# Patient Record
Sex: FEMALE | Race: WHITE | URBAN - METROPOLITAN AREA
[De-identification: names, ages, dates, MRNs, and addresses within clinical notes are randomized per-mention and may not be internally consistent; named-entity substitution may affect disease eponyms.]

---

## 2017-04-03 PROBLEM — I48.91 ATRIAL FIBRILLATION (HCC): Status: ACTIVE | Noted: 2017-04-03

## 2017-04-03 PROBLEM — R07.9 CHEST PAIN: Status: ACTIVE | Noted: 2017-04-03

## 2017-04-05 ENCOUNTER — TELEPHONE (OUTPATIENT)
Dept: CARDIOLOGY CLINIC | Age: 52
End: 2017-04-05

## 2023-05-02 PROBLEM — I10 HTN (HYPERTENSION): Status: ACTIVE | Noted: 2023-05-02

## 2023-05-02 PROBLEM — G45.9 TIA (TRANSIENT ISCHEMIC ATTACK): Status: ACTIVE | Noted: 2023-05-02

## 2023-05-02 PROBLEM — J45.909 ALLERGIC BRONCHITIS WITHOUT COMPLICATION (HHS-HCC): Status: ACTIVE | Noted: 2023-05-02

## 2023-05-02 PROBLEM — F51.04 CHRONIC INSOMNIA: Status: ACTIVE | Noted: 2023-05-02

## 2023-05-02 PROBLEM — E78.5 HYPERLIPIDEMIA: Status: ACTIVE | Noted: 2023-05-02

## 2023-05-02 PROBLEM — D47.2 MONOCLONAL GAMMOPATHY: Status: ACTIVE | Noted: 2023-05-02

## 2023-05-02 PROBLEM — E66.01 MORBID OBESITY (MULTI): Status: ACTIVE | Noted: 2023-05-02

## 2023-05-02 PROBLEM — I48.0 PAROXYSMAL ATRIAL FIBRILLATION (MULTI): Status: ACTIVE | Noted: 2023-05-02

## 2023-05-02 PROBLEM — R22.2 MASS OF SUBCUTANEOUS TISSUE OF BACK: Status: ACTIVE | Noted: 2023-05-02

## 2023-05-02 PROBLEM — K58.2 IRRITABLE BOWEL SYNDROME WITH BOTH CONSTIPATION AND DIARRHEA: Status: ACTIVE | Noted: 2023-05-02

## 2023-05-02 PROBLEM — R07.89 ATYPICAL CHEST PAIN: Status: ACTIVE | Noted: 2023-05-02

## 2023-05-02 PROBLEM — E11.9 DIABETES (MULTI): Status: ACTIVE | Noted: 2023-05-02

## 2023-05-02 PROBLEM — Z86.79 HISTORY OF CEREBROVASCULAR DISEASE: Status: ACTIVE | Noted: 2023-05-02

## 2023-05-02 PROBLEM — S49.91XA INJURY OF RIGHT UPPER EXTREMITY: Status: ACTIVE | Noted: 2023-05-02

## 2023-05-02 PROBLEM — R93.1 ABNORMAL SCREENING CARDIAC CT: Status: ACTIVE | Noted: 2023-05-02

## 2023-05-02 RX ORDER — CYANOCOBALAMIN 1000 UG/ML
1000 INJECTION, SOLUTION INTRAMUSCULAR; SUBCUTANEOUS
COMMUNITY
Start: 2021-01-12

## 2023-05-02 RX ORDER — METOPROLOL SUCCINATE 25 MG/1
1 TABLET, EXTENDED RELEASE ORAL DAILY
COMMUNITY
Start: 2021-04-06 | End: 2023-05-17 | Stop reason: SDUPTHER

## 2023-05-02 RX ORDER — LISINOPRIL 20 MG/1
1 TABLET ORAL DAILY
COMMUNITY
Start: 2020-11-09 | End: 2023-05-17 | Stop reason: SDUPTHER

## 2023-05-02 RX ORDER — FLUTICASONE PROPIONATE 50 MCG
1 SPRAY, SUSPENSION (ML) NASAL DAILY
COMMUNITY
Start: 2021-05-19

## 2023-05-02 RX ORDER — ROSUVASTATIN CALCIUM 5 MG/1
1 TABLET, COATED ORAL DAILY
COMMUNITY
Start: 2021-09-17 | End: 2023-05-17 | Stop reason: SDUPTHER

## 2023-05-02 RX ORDER — CYANOCOBALAMIN (VITAMIN B-12) 500 MCG
1 TABLET ORAL DAILY
COMMUNITY

## 2023-05-02 RX ORDER — AMITRIPTYLINE HYDROCHLORIDE 25 MG/1
25 TABLET, FILM COATED ORAL
COMMUNITY
Start: 2021-05-19 | End: 2023-05-17 | Stop reason: SDUPTHER

## 2023-05-02 RX ORDER — MULTIVITAMIN
1 TABLET ORAL DAILY
COMMUNITY

## 2023-05-02 RX ORDER — FERUMOXYTOL 510 MG/17ML
510 INJECTION INTRAVENOUS
COMMUNITY
Start: 2021-01-12

## 2023-05-02 RX ORDER — TRAZODONE HYDROCHLORIDE 100 MG/1
100 TABLET ORAL
COMMUNITY
Start: 2020-11-09 | End: 2023-05-17 | Stop reason: SDUPTHER

## 2023-05-17 ENCOUNTER — OFFICE VISIT (OUTPATIENT)
Dept: PRIMARY CARE | Facility: CLINIC | Age: 58
End: 2023-05-17
Payer: COMMERCIAL

## 2023-05-17 VITALS
RESPIRATION RATE: 16 BRPM | WEIGHT: 221 LBS | DIASTOLIC BLOOD PRESSURE: 78 MMHG | HEIGHT: 68 IN | SYSTOLIC BLOOD PRESSURE: 144 MMHG | TEMPERATURE: 97.1 F | BODY MASS INDEX: 33.49 KG/M2 | HEART RATE: 78 BPM | OXYGEN SATURATION: 99 %

## 2023-05-17 DIAGNOSIS — G45.9 TIA (TRANSIENT ISCHEMIC ATTACK): ICD-10-CM

## 2023-05-17 DIAGNOSIS — I10 HYPERTENSION, UNSPECIFIED TYPE: Primary | ICD-10-CM

## 2023-05-17 DIAGNOSIS — F51.04 CHRONIC INSOMNIA: ICD-10-CM

## 2023-05-17 DIAGNOSIS — I48.91 ATRIAL FIBRILLATION, UNSPECIFIED TYPE (MULTI): Chronic | ICD-10-CM

## 2023-05-17 DIAGNOSIS — F41.9 ANXIETY: ICD-10-CM

## 2023-05-17 DIAGNOSIS — E11.42 TYPE 2 DIABETES MELLITUS WITH DIABETIC POLYNEUROPATHY, WITHOUT LONG-TERM CURRENT USE OF INSULIN (MULTI): ICD-10-CM

## 2023-05-17 DIAGNOSIS — E78.5 HYPERLIPIDEMIA, UNSPECIFIED HYPERLIPIDEMIA TYPE: ICD-10-CM

## 2023-05-17 PROBLEM — R06.02 SOB (SHORTNESS OF BREATH): Status: ACTIVE | Noted: 2023-05-17

## 2023-05-17 PROBLEM — R07.9 CHEST PAIN: Status: ACTIVE | Noted: 2017-04-03

## 2023-05-17 PROBLEM — E78.00 PURE HYPERCHOLESTEROLEMIA: Status: ACTIVE | Noted: 2023-04-18

## 2023-05-17 PROBLEM — R07.9 CHEST PAIN: Status: RESOLVED | Noted: 2017-04-03 | Resolved: 2023-05-17

## 2023-05-17 PROBLEM — S49.91XA INJURY OF RIGHT UPPER EXTREMITY: Status: RESOLVED | Noted: 2023-05-02 | Resolved: 2023-05-17

## 2023-05-17 PROBLEM — E66.01 MORBID OBESITY (MULTI): Status: RESOLVED | Noted: 2023-05-02 | Resolved: 2023-05-17

## 2023-05-17 PROBLEM — E11.9 DIABETES (MULTI): Status: RESOLVED | Noted: 2023-05-02 | Resolved: 2023-05-17

## 2023-05-17 PROBLEM — E66.9 OBESITY, CLASS II, BMI 35-39.9: Chronic | Status: ACTIVE | Noted: 2019-03-25

## 2023-05-17 PROBLEM — E66.812 OBESITY, CLASS II, BMI 35-39.9: Chronic | Status: ACTIVE | Noted: 2019-03-25

## 2023-05-17 PROBLEM — E78.00 PURE HYPERCHOLESTEROLEMIA: Status: RESOLVED | Noted: 2023-04-18 | Resolved: 2023-05-17

## 2023-05-17 PROBLEM — F17.200 NICOTINE USE DISORDER: Chronic | Status: ACTIVE | Noted: 2019-03-25

## 2023-05-17 PROBLEM — R06.02 SOB (SHORTNESS OF BREATH): Status: RESOLVED | Noted: 2023-05-17 | Resolved: 2023-05-17

## 2023-05-17 PROBLEM — Z86.79 HISTORY OF CEREBROVASCULAR DISEASE: Status: RESOLVED | Noted: 2023-05-02 | Resolved: 2023-05-17

## 2023-05-17 PROBLEM — R07.89 ATYPICAL CHEST PAIN: Status: RESOLVED | Noted: 2023-05-02 | Resolved: 2023-05-17

## 2023-05-17 PROCEDURE — 4004F PT TOBACCO SCREEN RCVD TLK: CPT

## 2023-05-17 PROCEDURE — 99214 OFFICE O/P EST MOD 30 MIN: CPT

## 2023-05-17 PROCEDURE — 3051F HG A1C>EQUAL 7.0%<8.0%: CPT

## 2023-05-17 PROCEDURE — 4010F ACE/ARB THERAPY RXD/TAKEN: CPT

## 2023-05-17 PROCEDURE — 3078F DIAST BP <80 MM HG: CPT

## 2023-05-17 PROCEDURE — 3077F SYST BP >= 140 MM HG: CPT

## 2023-05-17 RX ORDER — AMITRIPTYLINE HYDROCHLORIDE 25 MG/1
25 TABLET, FILM COATED ORAL NIGHTLY
Qty: 90 TABLET | Refills: 3 | Status: SHIPPED | OUTPATIENT
Start: 2023-05-17

## 2023-05-17 RX ORDER — ROSUVASTATIN CALCIUM 5 MG/1
5 TABLET, COATED ORAL DAILY
Qty: 90 TABLET | Refills: 3 | Status: SHIPPED | OUTPATIENT
Start: 2023-05-17

## 2023-05-17 RX ORDER — TRAZODONE HYDROCHLORIDE 100 MG/1
100 TABLET ORAL NIGHTLY
Qty: 30 TABLET | Refills: 1 | Status: SHIPPED | OUTPATIENT
Start: 2023-05-17

## 2023-05-17 RX ORDER — LISINOPRIL 20 MG/1
10 TABLET ORAL DAILY
Qty: 45 TABLET | Refills: 3 | Status: SHIPPED | OUTPATIENT
Start: 2023-05-17

## 2023-05-17 RX ORDER — METOPROLOL SUCCINATE 25 MG/1
12.5 TABLET, EXTENDED RELEASE ORAL DAILY
Qty: 45 TABLET | Refills: 2 | Status: SHIPPED | OUTPATIENT
Start: 2023-05-17

## 2023-05-17 SDOH — ECONOMIC STABILITY: FOOD INSECURITY: WITHIN THE PAST 12 MONTHS, THE FOOD YOU BOUGHT JUST DIDN'T LAST AND YOU DIDN'T HAVE MONEY TO GET MORE.: NEVER TRUE

## 2023-05-17 SDOH — ECONOMIC STABILITY: FOOD INSECURITY: WITHIN THE PAST 12 MONTHS, YOU WORRIED THAT YOUR FOOD WOULD RUN OUT BEFORE YOU GOT MONEY TO BUY MORE.: NEVER TRUE

## 2023-05-17 ASSESSMENT — ENCOUNTER SYMPTOMS
GASTROINTESTINAL NEGATIVE: 1
NEUROLOGICAL NEGATIVE: 1
CARDIOVASCULAR NEGATIVE: 1
ENDOCRINE NEGATIVE: 1
HEMATOLOGIC/LYMPHATIC NEGATIVE: 1
EYES NEGATIVE: 1
MUSCULOSKELETAL NEGATIVE: 1
PSYCHIATRIC NEGATIVE: 1
CONSTITUTIONAL NEGATIVE: 1
RESPIRATORY NEGATIVE: 1

## 2023-05-17 ASSESSMENT — PATIENT HEALTH QUESTIONNAIRE - PHQ9
SUM OF ALL RESPONSES TO PHQ9 QUESTIONS 1 & 2: 0
2. FEELING DOWN, DEPRESSED OR HOPELESS: NOT AT ALL
1. LITTLE INTEREST OR PLEASURE IN DOING THINGS: NOT AT ALL

## 2023-05-17 ASSESSMENT — PAIN SCALES - GENERAL: PAINLEVEL: 4

## 2023-05-17 ASSESSMENT — LIFESTYLE VARIABLES
HOW MANY STANDARD DRINKS CONTAINING ALCOHOL DO YOU HAVE ON A TYPICAL DAY: PATIENT DOES NOT DRINK
SKIP TO QUESTIONS 9-10: 1
HOW OFTEN DO YOU HAVE A DRINK CONTAINING ALCOHOL: NEVER
HOW OFTEN DO YOU HAVE SIX OR MORE DRINKS ON ONE OCCASION: NEVER
AUDIT-C TOTAL SCORE: 0

## 2023-05-17 NOTE — ASSESSMENT & PLAN NOTE
Hypertension ranging from 140-160s, has been out of medications for 3 days. Hypertensive in office today at 144/78.    Having lightheadedness/fatigue and SBP's in the 100s. Has been skipping and halving doses of metoprolol and lisinopril due to symptoms.  Decrease lisinopril to 10mg daily (take 20mg if blood pressure persistently increasing)  Decrease metoprolol XL to 12.5mg daily

## 2023-05-17 NOTE — PROGRESS NOTES
Subjective   Patient ID: Rosalba Carter is a 57 y.o. female who presents for visit to [facility].    Hospitalized at [facility] 4/12-14/2023 for chest pain. C on 4/14/2023 showing moderate non-obstructive CAD (mid LAD 40%, Prox OM1 30%, prox OM2 50%, LVEDP 10mmHg). Discharged home with cardiology follow up.     Checking blood pressures at home and pressures have been running 140-160s before meds; BP drops to 109/65 when taking lisinopril every day. After 3-4 days BP drops too much, gets too low and stays like that and causes fatigue. Stops metoprolol for a few days and then will take half of a lisinopril. Ran out of lisinopril 3 days ago. Reports high stress at work complicated by working a lot of overtime is wearing on her and is likely contributing to uncontrolled hypertension.     Diet: Worsening sweet tooth attributed to high stress. Has tendency to snack at work, hasn't done this since hospitalization in April, tending more toward fruit as a snack.  Trying to increase protein but doesn't eat a lot of meat. Loves veggies. Avoiding junk food, some diet Dr. Pepper (5 oz per day), drinking about 2-3 pots per day  Exercise: No regular exercise outside of work, working a lot of hours and on her feet the whole time  Weight: Generally stable  Water: Doesn't drink much free water  Sleep: OK sleep, getting about 5-6 hours per night due to work hours. Has trouble falling asleep due to having a lot on her mind, has tried melatonin 10mg at bedtime which helped.   Social: -  is disabled and requires a lot of personal care provider by her son who lives with her, lives in a 2 floor home, doesn't use upper level. 2 adult children, no pets  Professional: Works full time as dietary aide at [employer]    Review of Systems   Constitutional: Negative.    HENT: Negative.     Eyes: Negative.    Respiratory: Negative.     Cardiovascular: Negative.    Gastrointestinal: Negative.    Endocrine: Negative.    Genitourinary:  Negative.    Musculoskeletal: Negative.    Skin: Negative.    Neurological: Negative.    Hematological: Negative.    Psychiatric/Behavioral: Negative.          Current Outpatient Medications   Medication Sig Dispense Refill    ascorbic acid (Vitamin C) 100 mg tablet Take 1 tablet (100 mg) by mouth once daily.      cholecalciferol (Vitamin D-3) 10 MCG (400 UNIT) tablet Take 1 tablet (10 mcg) by mouth once daily.      cyanocobalamin (Vitamin B-12) 1,000 mcg/mL injection Inject 1 mL (1,000 mcg) into the shoulder, thigh, or buttocks every 30 (thirty) days.      ferumoxytol (Feraheme) 510 mg/17 mL (30 mg/mL) injection Infuse 17 mL (510 mg) into a venous catheter.      fluticasone (Flonase) 50 mcg/actuation nasal spray Administer 1 spray into affected nostril(s) once daily.      multivitamin tablet Take 1 tablet by mouth once daily.      amitriptyline (Elavil) 25 mg tablet Take 1 tablet (25 mg) by mouth once daily at bedtime. 90 tablet 3    apixaban (Eliquis) 5 mg tablet Take 1 tablet (5 mg) by mouth 2 times a day. 90 tablet 3    lisinopril 20 mg tablet Take 0.5 tablets (10 mg) by mouth once daily. 45 tablet 3    metoprolol succinate XL (Toprol-XL) 25 mg 24 hr tablet Take 0.5 tablets (12.5 mg) by mouth once daily. 45 tablet 2    rosuvastatin (Crestor) 5 mg tablet Take 1 tablet (5 mg) by mouth once daily. 90 tablet 3    traZODone (Desyrel) 100 mg tablet Take 1 tablet (100 mg) by mouth once daily at bedtime. 30 tablet 1     No current facility-administered medications for this visit.     Past Surgical History:   Procedure Laterality Date    OTHER SURGICAL HISTORY  2020     section    OTHER SURGICAL HISTORY  2020    Liz-en-Y gastric bypass    OTHER SURGICAL HISTORY  2020    Hernia repair    OTHER SURGICAL HISTORY  2020    Abdominoplasty    OTHER SURGICAL HISTORY  2020    Hysterectomy    OTHER SURGICAL HISTORY  2021    Lipoma excision     Family History   Problem Relation Name Age  "of Onset    Coronary artery disease Mother      Coronary artery disease Father      Stroke Father      Stroke Brother        Social History     Tobacco Use    Smoking status: Every Day     Packs/day: 1.00     Years: 40.00     Pack years: 40.00     Types: Cigarettes    Smokeless tobacco: Never   Vaping Use    Vaping status: Never Used     Passive vaping exposure: Yes   Substance Use Topics    Alcohol use: Never    Drug use: Never        Objective     Visit Vitals  /78 (BP Location: Right arm, Patient Position: Sitting, BP Cuff Size: Adult) Comment: hasnot taken meds   Pulse 78   Temp 36.2 °C (97.1 °F) (Temporal)   Resp 16   Ht 1.727 m (5' 8\")   Wt 100 kg (221 lb)   SpO2 99%   BMI 33.60 kg/m²   Smoking Status Every Day   BSA 2.19 m²        Physical Exam  Constitutional:       Appearance: Normal appearance.   HENT:      Head: Normocephalic and atraumatic.   Eyes:      Extraocular Movements: Extraocular movements intact.      Pupils: Pupils are equal, round, and reactive to light.   Cardiovascular:      Rate and Rhythm: Regular rhythm. Bradycardia present.   Pulmonary:      Effort: Pulmonary effort is normal.      Breath sounds: Normal breath sounds.   Abdominal:      General: Abdomen is flat. Bowel sounds are normal.      Palpations: Abdomen is soft.   Musculoskeletal:         General: Normal range of motion.   Neurological:      General: No focal deficit present.      Mental Status: She is alert and oriented to person, place, and time.   Psychiatric:         Mood and Affect: Mood normal.         Behavior: Behavior normal.           Assessment/Plan   Problem List Items Addressed This Visit       Chronic insomnia    Relevant Medications    traZODone (Desyrel) 100 mg tablet    HTN (hypertension) - Primary     Hypertension ranging from 140-160s, has been out of medications for 3 days. Hypertensive in office today at 144/78.    Having lightheadedness/fatigue and SBP's in the 100s. Has been skipping and halving doses " of metoprolol and lisinopril due to symptoms.  Decrease lisinopril to 10mg daily (take 20mg if blood pressure persistently increasing)  Decrease metoprolol XL to 12.5mg daily         Relevant Medications    lisinopril 20 mg tablet    metoprolol succinate XL (Toprol-XL) 25 mg 24 hr tablet    Hyperlipidemia    Relevant Medications    rosuvastatin (Crestor) 5 mg tablet    TIA (transient ischemic attack)    Relevant Medications    apixaban (Eliquis) 5 mg tablet    Type 2 diabetes mellitus with diabetic polyneuropathy, without long-term current use of insulin (CMS/HCC)     Would like to try dietary changes, trying to avoid medication  Consider metformin 500 BID at next visit if A1c > 7%         Relevant Orders    CBC    Comprehensive Metabolic Panel    Hemoglobin A1C    Lipid Panel    Atrial fibrillation (CMS/HCC) (Chronic)     NSR today per auscultation    Follow up with cardiology Dr. Khan in June         Relevant Medications    apixaban (Eliquis) 5 mg tablet    metoprolol succinate XL (Toprol-XL) 25 mg 24 hr tablet    Anxiety     Well managed with amitriptyline 25mg at bedtime. Continue.          Relevant Medications    amitriptyline (Elavil) 25 mg tablet       All pertinent lab work and results were reviewed with patient.     Follow up with me in 3 months or sooner as needed for DM2 and hypertension    Temi Yepez, REINA-CNS

## 2023-05-17 NOTE — PATIENT INSTRUCTIONS
Thank you for coming to see me today.  If you have any questions or concerns following our visit, please contact the office.  Phone: [phone]    Follow up with me in 3 months or sooner as needed    1)  DECREASE lisinopril to 10mg daily. Take every day    2) DECREASE metoprolol XL to 12.5mg daily take every day.  Keep checking blood pressures around midday after resting for 5 minutes    3) Work on cutting back sugar and carbs    4) Get fasting labwork before next visit.  The lab is down the vuong from our office.

## 2023-05-17 NOTE — ASSESSMENT & PLAN NOTE
Would like to try dietary changes, trying to avoid medication  Consider metformin 500 BID at next visit if A1c > 7%

## 2023-09-12 PROBLEM — F33.2 SEVERE RECURRENT MAJOR DEPRESSION WITHOUT PSYCHOTIC FEATURES (MULTI): Status: ACTIVE | Noted: 2023-09-12

## 2023-09-12 PROBLEM — R60.0 EDEMA OF BOTH LEGS: Status: ACTIVE | Noted: 2023-09-12

## 2023-09-12 PROBLEM — S29.9XXA RIB INJURY: Status: ACTIVE | Noted: 2023-09-12

## 2023-09-13 VITALS
SYSTOLIC BLOOD PRESSURE: 142 MMHG | HEART RATE: 63 BPM | DIASTOLIC BLOOD PRESSURE: 76 MMHG | WEIGHT: 219 LBS | RESPIRATION RATE: 16 BRPM | BODY MASS INDEX: 34.37 KG/M2 | HEIGHT: 67 IN

## 2023-09-14 RX ORDER — AMLODIPINE BESYLATE 5 MG/1
1 TABLET ORAL DAILY
COMMUNITY

## 2023-09-14 RX ORDER — ALBUTEROL SULFATE 90 UG/1
2 AEROSOL, METERED RESPIRATORY (INHALATION) EVERY 4 HOURS PRN
COMMUNITY
Start: 2020-05-13

## 2023-10-13 DIAGNOSIS — E53.8 VITAMIN B12 DEFICIENCY: ICD-10-CM

## 2023-10-13 DIAGNOSIS — D47.2 MONOCLONAL GAMMOPATHY: ICD-10-CM

## 2023-10-13 DIAGNOSIS — D50.9 IRON DEFICIENCY ANEMIA, UNSPECIFIED IRON DEFICIENCY ANEMIA TYPE: ICD-10-CM

## 2023-10-16 ENCOUNTER — INFUSION (OUTPATIENT)
Dept: HEMATOLOGY/ONCOLOGY | Facility: CLINIC | Age: 58
End: 2023-10-16
Payer: COMMERCIAL

## 2023-10-16 ENCOUNTER — OFFICE VISIT (OUTPATIENT)
Dept: HEMATOLOGY/ONCOLOGY | Facility: CLINIC | Age: 58
End: 2023-10-16
Payer: COMMERCIAL

## 2023-10-16 ENCOUNTER — APPOINTMENT (OUTPATIENT)
Dept: HEMATOLOGY/ONCOLOGY | Facility: CLINIC | Age: 58
End: 2023-10-16
Payer: COMMERCIAL

## 2023-10-16 ENCOUNTER — LAB (OUTPATIENT)
Dept: LAB | Facility: HOSPITAL | Age: 58
End: 2023-10-16
Payer: COMMERCIAL

## 2023-10-16 VITALS
HEART RATE: 78 BPM | WEIGHT: 225.31 LBS | SYSTOLIC BLOOD PRESSURE: 145 MMHG | TEMPERATURE: 97.7 F | BODY MASS INDEX: 35.36 KG/M2 | HEIGHT: 67 IN | RESPIRATION RATE: 16 BRPM | DIASTOLIC BLOOD PRESSURE: 78 MMHG | OXYGEN SATURATION: 96 %

## 2023-10-16 DIAGNOSIS — E53.8 VITAMIN B12 DEFICIENCY: ICD-10-CM

## 2023-10-16 DIAGNOSIS — E53.8 B12 DEFICIENCY: ICD-10-CM

## 2023-10-16 DIAGNOSIS — D50.9 IRON DEFICIENCY ANEMIA, UNSPECIFIED IRON DEFICIENCY ANEMIA TYPE: ICD-10-CM

## 2023-10-16 DIAGNOSIS — D47.2 MONOCLONAL GAMMOPATHY: ICD-10-CM

## 2023-10-16 DIAGNOSIS — E53.8 B12 DEFICIENCY: Primary | ICD-10-CM

## 2023-10-16 LAB
BASOPHILS # BLD AUTO: 0.05 X10*3/UL (ref 0–0.1)
BASOPHILS NFR BLD AUTO: 0.5 %
EOSINOPHIL # BLD AUTO: 0.34 X10*3/UL (ref 0–0.7)
EOSINOPHIL NFR BLD AUTO: 3.7 %
ERYTHROCYTE [DISTWIDTH] IN BLOOD BY AUTOMATED COUNT: 13.1 % (ref 11.5–14.5)
FERRITIN SERPL-MCNC: 21 NG/ML (ref 8–150)
HCT VFR BLD AUTO: 43.8 % (ref 36–46)
HGB BLD-MCNC: 13.9 G/DL (ref 12–16)
IGA SERPL-MCNC: 249 MG/DL (ref 70–400)
IGG SERPL-MCNC: 911 MG/DL (ref 700–1600)
IGM SERPL-MCNC: 70 MG/DL (ref 40–230)
IMM GRANULOCYTES # BLD AUTO: 0.02 X10*3/UL (ref 0–0.7)
IMM GRANULOCYTES NFR BLD AUTO: 0.2 % (ref 0–0.9)
IRON SATN MFR SERPL: 13 % (ref 25–45)
IRON SERPL-MCNC: 49 UG/DL (ref 35–150)
LYMPHOCYTES # BLD AUTO: 2.05 X10*3/UL (ref 1.2–4.8)
LYMPHOCYTES NFR BLD AUTO: 22.2 %
MCH RBC QN AUTO: 26.7 PG (ref 26–34)
MCHC RBC AUTO-ENTMCNC: 31.7 G/DL (ref 32–36)
MCV RBC AUTO: 84 FL (ref 80–100)
MONOCYTES # BLD AUTO: 0.63 X10*3/UL (ref 0.1–1)
MONOCYTES NFR BLD AUTO: 6.8 %
NEUTROPHILS # BLD AUTO: 6.14 X10*3/UL (ref 1.2–7.7)
NEUTROPHILS NFR BLD AUTO: 66.6 %
PLATELET # BLD AUTO: 286 X10*3/UL (ref 150–450)
PMV BLD AUTO: 9.3 FL (ref 7.5–11.5)
RBC # BLD AUTO: 5.2 X10*6/UL (ref 4–5.2)
TIBC SERPL-MCNC: 378 UG/DL (ref 240–445)
UIBC SERPL-MCNC: 329 UG/DL (ref 110–370)
VIT B12 SERPL-MCNC: 315 PG/ML (ref 211–911)
WBC # BLD AUTO: 9.2 X10*3/UL (ref 4.4–11.3)

## 2023-10-16 PROCEDURE — 99213 OFFICE O/P EST LOW 20 MIN: CPT | Performed by: INTERNAL MEDICINE

## 2023-10-16 PROCEDURE — 82728 ASSAY OF FERRITIN: CPT

## 2023-10-16 PROCEDURE — 83521 IG LIGHT CHAINS FREE EACH: CPT | Mod: CMCLAB

## 2023-10-16 PROCEDURE — 83550 IRON BINDING TEST: CPT

## 2023-10-16 PROCEDURE — 3078F DIAST BP <80 MM HG: CPT | Performed by: INTERNAL MEDICINE

## 2023-10-16 PROCEDURE — 85025 COMPLETE CBC W/AUTO DIFF WBC: CPT

## 2023-10-16 PROCEDURE — 4004F PT TOBACCO SCREEN RCVD TLK: CPT | Performed by: INTERNAL MEDICINE

## 2023-10-16 PROCEDURE — 2500000004 HC RX 250 GENERAL PHARMACY W/ HCPCS (ALT 636 FOR OP/ED): Mod: SE | Performed by: INTERNAL MEDICINE

## 2023-10-16 PROCEDURE — 3077F SYST BP >= 140 MM HG: CPT | Performed by: INTERNAL MEDICINE

## 2023-10-16 PROCEDURE — 82607 VITAMIN B-12: CPT

## 2023-10-16 PROCEDURE — 82784 ASSAY IGA/IGD/IGG/IGM EACH: CPT | Mod: CMCLAB

## 2023-10-16 PROCEDURE — 4010F ACE/ARB THERAPY RXD/TAKEN: CPT | Performed by: INTERNAL MEDICINE

## 2023-10-16 PROCEDURE — 3051F HG A1C>EQUAL 7.0%<8.0%: CPT | Performed by: INTERNAL MEDICINE

## 2023-10-16 PROCEDURE — 96372 THER/PROPH/DIAG INJ SC/IM: CPT

## 2023-10-16 PROCEDURE — 83521 IG LIGHT CHAINS FREE EACH: CPT

## 2023-10-16 PROCEDURE — 36415 COLL VENOUS BLD VENIPUNCTURE: CPT

## 2023-10-16 RX ORDER — CYANOCOBALAMIN 1000 UG/ML
1000 INJECTION, SOLUTION INTRAMUSCULAR; SUBCUTANEOUS ONCE
Status: COMPLETED | OUTPATIENT
Start: 2023-10-16 | End: 2023-10-16

## 2023-10-16 RX ORDER — ALBUTEROL SULFATE 0.83 MG/ML
3 SOLUTION RESPIRATORY (INHALATION) AS NEEDED
Status: DISCONTINUED | OUTPATIENT
Start: 2023-10-16 | End: 2023-10-16 | Stop reason: HOSPADM

## 2023-10-16 RX ORDER — FAMOTIDINE 10 MG/ML
20 INJECTION INTRAVENOUS ONCE AS NEEDED
Status: DISCONTINUED | OUTPATIENT
Start: 2023-10-16 | End: 2023-10-16 | Stop reason: HOSPADM

## 2023-10-16 RX ORDER — DIPHENHYDRAMINE HYDROCHLORIDE 50 MG/ML
50 INJECTION INTRAMUSCULAR; INTRAVENOUS AS NEEDED
Status: DISCONTINUED | OUTPATIENT
Start: 2023-10-16 | End: 2023-10-16 | Stop reason: HOSPADM

## 2023-10-16 RX ORDER — DIPHENHYDRAMINE HYDROCHLORIDE 50 MG/ML
50 INJECTION INTRAMUSCULAR; INTRAVENOUS AS NEEDED
Status: CANCELLED | OUTPATIENT
Start: 2024-01-01

## 2023-10-16 RX ORDER — DIPHENHYDRAMINE HYDROCHLORIDE 50 MG/ML
50 INJECTION INTRAMUSCULAR; INTRAVENOUS AS NEEDED
Status: CANCELLED | OUTPATIENT
Start: 2023-10-16

## 2023-10-16 RX ORDER — FAMOTIDINE 10 MG/ML
20 INJECTION INTRAVENOUS ONCE AS NEEDED
Status: CANCELLED | OUTPATIENT
Start: 2024-01-01

## 2023-10-16 RX ORDER — CYANOCOBALAMIN 1000 UG/ML
1000 INJECTION, SOLUTION INTRAMUSCULAR; SUBCUTANEOUS ONCE
Status: CANCELLED | OUTPATIENT
Start: 2024-01-01

## 2023-10-16 RX ORDER — ALBUTEROL SULFATE 0.83 MG/ML
3 SOLUTION RESPIRATORY (INHALATION) AS NEEDED
Status: CANCELLED | OUTPATIENT
Start: 2023-10-16

## 2023-10-16 RX ORDER — FAMOTIDINE 10 MG/ML
20 INJECTION INTRAVENOUS ONCE AS NEEDED
Status: CANCELLED | OUTPATIENT
Start: 2023-10-16

## 2023-10-16 RX ORDER — CYANOCOBALAMIN 1000 UG/ML
1000 INJECTION, SOLUTION INTRAMUSCULAR; SUBCUTANEOUS ONCE
Status: CANCELLED | OUTPATIENT
Start: 2023-10-16

## 2023-10-16 RX ORDER — EPINEPHRINE 0.3 MG/.3ML
0.3 INJECTION SUBCUTANEOUS EVERY 5 MIN PRN
Status: CANCELLED | OUTPATIENT
Start: 2024-01-01

## 2023-10-16 RX ORDER — EPINEPHRINE 0.3 MG/.3ML
0.3 INJECTION SUBCUTANEOUS EVERY 5 MIN PRN
Status: DISCONTINUED | OUTPATIENT
Start: 2023-10-16 | End: 2023-10-16 | Stop reason: HOSPADM

## 2023-10-16 RX ORDER — ALBUTEROL SULFATE 0.83 MG/ML
3 SOLUTION RESPIRATORY (INHALATION) AS NEEDED
Status: CANCELLED | OUTPATIENT
Start: 2024-01-01

## 2023-10-16 RX ORDER — EPINEPHRINE 0.3 MG/.3ML
0.3 INJECTION SUBCUTANEOUS EVERY 5 MIN PRN
Status: CANCELLED | OUTPATIENT
Start: 2023-10-16

## 2023-10-16 RX ADMIN — CYANOCOBALAMIN 1000 MCG: 1000 INJECTION, SOLUTION INTRAMUSCULAR at 14:22

## 2023-10-16 ASSESSMENT — PATIENT HEALTH QUESTIONNAIRE - PHQ9
SUM OF ALL RESPONSES TO PHQ9 QUESTIONS 1 AND 2: 2
2. FEELING DOWN, DEPRESSED OR HOPELESS: MORE THAN HALF THE DAYS
10. IF YOU CHECKED OFF ANY PROBLEMS, HOW DIFFICULT HAVE THESE PROBLEMS MADE IT FOR YOU TO DO YOUR WORK, TAKE CARE OF THINGS AT HOME, OR GET ALONG WITH OTHER PEOPLE: SOMEWHAT DIFFICULT
1. LITTLE INTEREST OR PLEASURE IN DOING THINGS: NOT AT ALL

## 2023-10-16 ASSESSMENT — COLUMBIA-SUICIDE SEVERITY RATING SCALE - C-SSRS
2. HAVE YOU ACTUALLY HAD ANY THOUGHTS OF KILLING YOURSELF?: NO
6. HAVE YOU EVER DONE ANYTHING, STARTED TO DO ANYTHING, OR PREPARED TO DO ANYTHING TO END YOUR LIFE?: NO
1. IN THE PAST MONTH, HAVE YOU WISHED YOU WERE DEAD OR WISHED YOU COULD GO TO SLEEP AND NOT WAKE UP?: NO

## 2023-10-16 ASSESSMENT — PAIN SCALES - GENERAL: PAINLEVEL: 0-NO PAIN

## 2023-10-16 NOTE — PROGRESS NOTES
Rosalba is here for a follow up visit with Dr. Villalba and every 12 weeks b12 injection. She has no complaints or concerns other than fatigue. Patient states she has her schedule. Tolerated her injection.

## 2023-10-16 NOTE — PROGRESS NOTES
Patient Visit Information:   Visit Type: Follow Up Visit      History of Present Illness:      ID Statement:    REGGIE BARRIGA is a 57 year old Female        Chief Complaint: IgM lambda monoclonal gammopathy,  B12 deficiency   Interval History:    1.  B12 deficiency #2 iron deficiency #3 gastric bypass #4 IgM monoclonal gammopathy              Patient is a 57-year-old female with history of  diabetes mellitus, hypertension, iron deficiency anemia, B12 deficiency anemia, s/p  gastric   bypass, she was recently evaluated because of sore throat, left ear pain and fever and diagnoses bronchitis.     Patient has long history of weakness, fatigue, muscular pain and has history of chronic anemia.  Patient is taking ferrous sulfate and B12 by mouth.  History of  weight gain approximately 25 pounds during past 6 months.  She is not very active, she still  smokes.  She has generalized weakness and fatigue.  She has history of anxiety and depression.     In June 2020 CBC dated showed WBC count 11.8, hemoglobin 11.8, hematocrit 40, MCV 79 and platelets 329.     CMP and LFT within normal limits, GUIDO negative, CRP 0.81 and sedimentation rate 47.     Serum protein electrophoresis and immunofixation did show IgM lambda monoclonal gammopathy, very small quantity of M spike, difficult to quantitate.     Patient is here for further evaluation.     Lab results discussed with the patient, medical records reviewed with patient.     Patient not compliant to medical care, no screening colonoscopy is done.   September 21, 2020     Patient has a history of iron deficiency anemia and B12 deficiency anemia and feeling better after IV Feraheme and B12 supplement.  Hemoglobin is stable,    Patient has IgM lambda monoclonal gammopathy with a due to MGUS which is stable      December 7, 2020     Patient is still complaining of weakness and fatigue, good appetite not doing regular exercise, he not working at this time.  Hemoglobin is  14.9.     March 1, 2021      Patient is feeling better today hemoglobin is 15.1, patient is receiving B12 injection every 4 weeks.  Good appetite, no nausea vomiting, no abdominal pain, still complaining of weakness and fatigue which is improving  June 21, 2021      Patient has a history of iron deficiency anemia and B12 deficiency anemia, patient receiving B12 injection every 4 weekly hemoglobin have been stable.  Patient is feeling better, something patient denied fatigue, good appetite, no nausea vomiting, no  abdominal pain, no diarrhea, no history of infection or bony pain.    10/16/3  Patient history of B12 deficiency anemia which have been resolved patient is receiving B12 injection.   Patient still has some weakness and fatigue.  Hemoglobin have been stable     No fever, good appetite no nausea vomiting, no abdominal pain, patient was complaining of weakness and fatigue still working full-time     History of IgM monoclonal gammopathy which also have been stable     Review of Systems:   Review of Systems:    Patient is very anxious,     Complaining of generalized weakness, fatigue, muscular pain     No headache and dizziness     No sore throat today, no fever     No chest pain, no shortness of breath, no cough     No nausea vomiting     No dyspepsia     Nonspecific abdominal pain     No diarrhea and constipation, no reactive bleed     No dysuria and hematuria     No vaginal discharge, status post hysterectomy     Patient not very active most times to home     She not doing regular exercise     She is gaining weight and she still smoke     No history of fall     Generalized weakness        Allergies and Intolerances:       Allergies:         ibuprofen: Drug, Unknown, Active         Latex: Latex, Hives/Urticaria, Active     Outpatient Medication Profile:  * Patient Currently Takes Medications as of 07-Dec-2022 12:58 documented in Structured Notes         Vitamin D3 : Last Dose Taken:  , orally once a day          Vitamin C: Last Dose Taken:  , orally once a day         multivitamin Multiple Vitamins oral capsule: Last Dose Taken:  , 1 cap(s)  oral once a day         Crestor 5 mg oral tablet: Last Dose Taken:  , 1 tab(s) orally once a  day         lisinopril 20 mg oral tablet: Last Dose Taken:  , 1 tab(s) orally once  a day         metoprolol: Last Dose Taken:               Medical History:         Smoking: ICD-10: F17.200, Status: Active         B12 deficiency anemia: ICD-10: D51.9, Status: Active         Iron deficiency anemia: ICD-10: D50.9, Status: Active         IgM monoclonal gammopathy of uncertain significance: ICD-10:  D47.2, Status: Active         Hypertension: ICD-10: I10, Status: Active         Diabetes mellitus: ICD-10: E11.9, Status: Active       Surg History:         Status post hysterectomy: ICD-10: Z90.710, Status: Active         Status post gastric bypass for obesity: ICD-10: Z98.84, Status:  Active     Family History: No Family History items are recorded  in the problem list.      Social History:   Social Substance History:  ·  Smoking Status current every day smoker            Vitals and Measurements:   Vitals: Temp: 36.7  HR: 77  RR: 16  BP: 140/84  SPO2%:   95   Measurements: HT(cm): 170.5  WT(kg): 98.9  BSA: 2.16   BMI:  34   Last 3 Weights & Heights: Date:                           Weight/Scale Type:                    Height:   07-Dec-2022 12:49                98.9  kg                     170.5  cm  16-Nov-2022 12:49                99.1  kg                     170.5  cm  21-Sep-2022 13:20                97.5  kg                     170.5  cm      Physical Exam:      Constitutional: awake/alert/oriented x3, no distress,   Eyes: PERRL, EOMI, clear sclera   ENMT: mucous membranes moist,   Head/Neck: Neck supple,   Respiratory/Thorax: Patent airways, CTAB, normal  breath sounds   Cardiovascular: Regular, rate and rhythm,  , normal  S 1and S 2   Gastrointestinal: Nondistended, soft,   nontender,      no  organomegaly, +BS,   Extremities: normal extremities, no cyanosis edema,   Neurological: alert and oriented x3,   Lymphatic: No significant lymphadenopathy   Psychological: Appropriate mood and behavior, very  anxious   Skin: Warm and dry, no lesions, no rashes         Lab Results:     ·  Results             Lab Results   Component Value Date    WBC 9.2 10/16/2023    HGB 13.9 10/16/2023    HCT 43.8 10/16/2023     10/16/2023    CHOL 177 04/13/2023    TRIG 86 04/13/2023    HDL 41.3 04/13/2023    ALT 12 04/13/2023    AST 12 04/13/2023    NA CANCELED 04/15/2023    K CANCELED 04/15/2023    CL CANCELED 04/15/2023    CREATININE CANCELED 04/15/2023    BUN CANCELED 04/15/2023    CO2 CANCELED 04/15/2023    TSH 1.55 07/29/2020    HGBA1C 7.5 (A) 04/13/2023     par         Assessment and Plan:      Assessment and Plan:   Assessment:    #1  MGUS, IgM lambda monoclonal gammopathy     #2.  S/p gastric bypass , obesity  , BMI of 38.2,     #3 iron deficiency anemia     #4 B12 deficiency anemia     #5 diabetes mellitus     #6 hypertension     #7 anxiety        Patient is a 57-year-old female with a history of hypertension, diabetes mellitus, status post gastric bypass, history of iron deficiency anemia and B12 deficiency anemia was evaluated for possible bronchitis.     Serum protein electrophoresis and immunofixation did show IgM lambda monoclonal gammopathy.  Most probably we are dealing with the MGUS.  Renal function within normal limits, calcium level within normal limits, patient with chronic anemia due to iron  deficiency anemia, clinically I am not suspecting multiple myeloma.     Pathophysiology paraproteinemia discussed with the patient and     #2 iron deficiency anemia, it is due to malabsorption, I will make a arrangement for IV Feraheme and check iron studies today     #3 B12 deficiency anemia, will make arrangements for B12 injections     #4 BMI 38.2, patient advised to have regular exercise, Klonopin intake none  more than 2000, try to lose body weight at least by 50 pounds.     #4 smoking,   long discussion with the patient regarding possible complications of long-term smoking and suggested to stop smoking and   patient promised to do so.     Plan I will check IgG M level today and also check beta-2 microglobulin and serum light chain.  I will check iron studies and B12 level and make arrangement for IV Feraheme and B12 supplement     March 1, 2021     #1 iron deficiency anemia, hemoglobin is stable     #2 B12 deficiency anemia, continue B12 supplements every 4 weeks     #3 MGUS, IgM lambda monoclonal gammopathy, stable continue to monitor     #4 BMI 38, continued regular exercise, Attempted Now More Than 2000-calorie per Day.       Lab Discussed with the Patient, Continue B12 Supplement, repeat  CBC, Iron Studies, B12 after 3 Months     June 21, 2021        #1 iron deficiency anemia, hemoglobin is stable     #2 B12 deficiency anemia, continue B12 supplements every 4 weeks     #3 MGUS, IgM lambda monoclonal gammopathy, stable continue to monitor     #4 BMI 37, continued regular exercise, Attempted Now More Than 2000-calorie per Day.     Clinically patient is doing very well, hemoglobin is stable, patient will receive B12 injection today then changed B12 injection every 8 weeks.  I will check iron studies and B12 level today.         Reevaluation after 4 months.      Monitor CBC, iron studies and B12 level.      Again suggested to have regular exercise try to lose some body weight.     10/16/23        #1 iron deficiency anemia, hemoglobin is stable     #2 B12 deficiency anemia, continue B12 supplements every 4 weeks     #3 MGUS, IgM lambda monoclonal gammopathy, stable continue to monitor     #4 BMI 34, continued regular exercise, Attempted Now More Than 2000-calorie per Day.     Clinically stable, hemoglobin is stable, B12 injection every 12 weeks.      Repeat CBC after 3 months  ,check iron study and B12 level.  Follow-up  "6 months  Plan:    Plan as above.      Follow-up after 6 months     Time spent 20 minutes        Patient Instructions:      Instructions Type: nutrition            Note Recipients: Jonna Campoverde MD -  [phone]   Select \"Yes\" when ready to send to Provider(s) Listed Above:  Note sent to providers named above         "

## 2023-10-16 NOTE — PATIENT INSTRUCTIONS
Continue Vitamin b12 injections every 12 weeks.     Labs to be done every 12 weeks have been ordered. Lab appointments are no longer scheduled.   You can arrive at least 15 minutes before appointment for injection to have blood work completed.     Follow up with Dr. Villalba in 6 months.

## 2023-10-17 LAB
KAPPA LC SERPL-MCNC: 3.4 MG/DL (ref 0.33–1.94)
KAPPA LC/LAMBDA SER: 1.98 {RATIO} (ref 0.26–1.65)
LAMBDA LC SERPL-MCNC: 1.72 MG/DL (ref 0.57–2.63)

## 2024-04-02 ENCOUNTER — INFUSION (OUTPATIENT)
Dept: HEMATOLOGY/ONCOLOGY | Facility: CLINIC | Age: 59
End: 2024-04-02
Payer: COMMERCIAL

## 2024-04-02 VITALS
HEART RATE: 87 BPM | WEIGHT: 223 LBS | RESPIRATION RATE: 16 BRPM | HEIGHT: 67 IN | SYSTOLIC BLOOD PRESSURE: 148 MMHG | BODY MASS INDEX: 35 KG/M2 | OXYGEN SATURATION: 96 % | DIASTOLIC BLOOD PRESSURE: 70 MMHG | TEMPERATURE: 97.7 F

## 2024-04-02 DIAGNOSIS — E53.8 B12 DEFICIENCY: ICD-10-CM

## 2024-04-02 PROCEDURE — 96372 THER/PROPH/DIAG INJ SC/IM: CPT | Performed by: INTERNAL MEDICINE

## 2024-04-02 PROCEDURE — 96372 THER/PROPH/DIAG INJ SC/IM: CPT

## 2024-04-02 PROCEDURE — 2500000004 HC RX 250 GENERAL PHARMACY W/ HCPCS (ALT 636 FOR OP/ED): Mod: SE | Performed by: INTERNAL MEDICINE

## 2024-04-02 RX ORDER — FAMOTIDINE 10 MG/ML
20 INJECTION INTRAVENOUS ONCE AS NEEDED
Status: DISCONTINUED | OUTPATIENT
Start: 2024-04-02 | End: 2024-04-02 | Stop reason: HOSPADM

## 2024-04-02 RX ORDER — ALBUTEROL SULFATE 0.83 MG/ML
3 SOLUTION RESPIRATORY (INHALATION) AS NEEDED
Status: DISCONTINUED | OUTPATIENT
Start: 2024-04-02 | End: 2024-04-02 | Stop reason: HOSPADM

## 2024-04-02 RX ORDER — EPINEPHRINE 0.3 MG/.3ML
0.3 INJECTION SUBCUTANEOUS EVERY 5 MIN PRN
OUTPATIENT
Start: 2024-07-01

## 2024-04-02 RX ORDER — DIPHENHYDRAMINE HYDROCHLORIDE 50 MG/ML
50 INJECTION INTRAMUSCULAR; INTRAVENOUS AS NEEDED
OUTPATIENT
Start: 2024-07-01

## 2024-04-02 RX ORDER — FAMOTIDINE 10 MG/ML
20 INJECTION INTRAVENOUS ONCE AS NEEDED
OUTPATIENT
Start: 2024-07-01

## 2024-04-02 RX ORDER — ALBUTEROL SULFATE 0.83 MG/ML
3 SOLUTION RESPIRATORY (INHALATION) AS NEEDED
OUTPATIENT
Start: 2024-07-01

## 2024-04-02 RX ORDER — DIPHENHYDRAMINE HYDROCHLORIDE 50 MG/ML
50 INJECTION INTRAMUSCULAR; INTRAVENOUS AS NEEDED
Status: DISCONTINUED | OUTPATIENT
Start: 2024-04-02 | End: 2024-04-02 | Stop reason: HOSPADM

## 2024-04-02 RX ORDER — CYANOCOBALAMIN 1000 UG/ML
1000 INJECTION, SOLUTION INTRAMUSCULAR; SUBCUTANEOUS ONCE
Status: COMPLETED | OUTPATIENT
Start: 2024-04-02 | End: 2024-04-02

## 2024-04-02 RX ORDER — EPINEPHRINE 0.3 MG/.3ML
0.3 INJECTION SUBCUTANEOUS EVERY 5 MIN PRN
Status: DISCONTINUED | OUTPATIENT
Start: 2024-04-02 | End: 2024-04-02 | Stop reason: HOSPADM

## 2024-04-02 RX ORDER — CYANOCOBALAMIN 1000 UG/ML
1000 INJECTION, SOLUTION INTRAMUSCULAR; SUBCUTANEOUS ONCE
OUTPATIENT
Start: 2024-07-01

## 2024-04-02 RX ADMIN — CYANOCOBALAMIN 1000 MCG: 1000 INJECTION INTRAMUSCULAR; SUBCUTANEOUS at 14:00

## 2024-04-02 ASSESSMENT — PAIN SCALES - GENERAL: PAINLEVEL: 0-NO PAIN

## 2024-04-02 NOTE — PROGRESS NOTES
Pt arrived awake, alert, oriented, no apparent distress with unlabored breaths. Pt reports feeling well toay without s/sx of illness. Pt here for her ordered quarterly B12 injection, in which she received and tolerated well. Pt given AVS with verbalized understanding of future appointments, no further questions or concerns, observed ambulating freely to exit area for discharge home.

## 2024-04-16 ENCOUNTER — OFFICE VISIT (OUTPATIENT)
Dept: HEMATOLOGY/ONCOLOGY | Facility: CLINIC | Age: 59
End: 2024-04-16
Payer: COMMERCIAL

## 2024-04-16 VITALS
OXYGEN SATURATION: 94 % | HEIGHT: 67 IN | WEIGHT: 224.43 LBS | DIASTOLIC BLOOD PRESSURE: 77 MMHG | SYSTOLIC BLOOD PRESSURE: 160 MMHG | TEMPERATURE: 97.9 F | RESPIRATION RATE: 16 BRPM | BODY MASS INDEX: 35.22 KG/M2 | HEART RATE: 75 BPM

## 2024-04-16 DIAGNOSIS — E53.8 B12 DEFICIENCY: ICD-10-CM

## 2024-04-16 DIAGNOSIS — D47.2 MONOCLONAL GAMMOPATHY: Primary | ICD-10-CM

## 2024-04-16 LAB
ALBUMIN SERPL BCP-MCNC: 4 G/DL (ref 3.4–5)
ALP SERPL-CCNC: 93 U/L (ref 33–110)
ALT SERPL W P-5'-P-CCNC: 10 U/L (ref 7–45)
ANION GAP SERPL CALC-SCNC: 9 MMOL/L (ref 10–20)
AST SERPL W P-5'-P-CCNC: 12 U/L (ref 9–39)
BASOPHILS # BLD AUTO: 0.03 X10*3/UL (ref 0–0.1)
BASOPHILS NFR BLD AUTO: 0.3 %
BILIRUB SERPL-MCNC: 0.4 MG/DL (ref 0–1.2)
BUN SERPL-MCNC: 12 MG/DL (ref 6–23)
CALCIUM SERPL-MCNC: 8.9 MG/DL (ref 8.6–10.3)
CHLORIDE SERPL-SCNC: 106 MMOL/L (ref 98–107)
CO2 SERPL-SCNC: 26 MMOL/L (ref 21–32)
CREAT SERPL-MCNC: 0.7 MG/DL (ref 0.5–1.05)
EGFRCR SERPLBLD CKD-EPI 2021: >90 ML/MIN/1.73M*2
EOSINOPHIL # BLD AUTO: 0.27 X10*3/UL (ref 0–0.7)
EOSINOPHIL NFR BLD AUTO: 2.9 %
ERYTHROCYTE [DISTWIDTH] IN BLOOD BY AUTOMATED COUNT: 13 % (ref 11.5–14.5)
GLUCOSE SERPL-MCNC: 157 MG/DL (ref 74–99)
HCT VFR BLD AUTO: 41.2 % (ref 36–46)
HGB BLD-MCNC: 13.2 G/DL (ref 12–16)
IGM SERPL-MCNC: 60 MG/DL (ref 40–230)
IMM GRANULOCYTES # BLD AUTO: 0.02 X10*3/UL (ref 0–0.7)
IMM GRANULOCYTES NFR BLD AUTO: 0.2 % (ref 0–0.9)
LYMPHOCYTES # BLD AUTO: 2.54 X10*3/UL (ref 1.2–4.8)
LYMPHOCYTES NFR BLD AUTO: 27.4 %
MCH RBC QN AUTO: 26.8 PG (ref 26–34)
MCHC RBC AUTO-ENTMCNC: 32 G/DL (ref 32–36)
MCV RBC AUTO: 84 FL (ref 80–100)
MONOCYTES # BLD AUTO: 0.52 X10*3/UL (ref 0.1–1)
MONOCYTES NFR BLD AUTO: 5.6 %
NEUTROPHILS # BLD AUTO: 5.88 X10*3/UL (ref 1.2–7.7)
NEUTROPHILS NFR BLD AUTO: 63.6 %
PLATELET # BLD AUTO: 302 X10*3/UL (ref 150–450)
POTASSIUM SERPL-SCNC: 4 MMOL/L (ref 3.5–5.3)
PROT SERPL-MCNC: 6.8 G/DL (ref 6.4–8.2)
RBC # BLD AUTO: 4.93 X10*6/UL (ref 4–5.2)
SODIUM SERPL-SCNC: 137 MMOL/L (ref 136–145)
VIT B12 SERPL-MCNC: 318 PG/ML (ref 211–911)
WBC # BLD AUTO: 9.3 X10*3/UL (ref 4.4–11.3)

## 2024-04-16 PROCEDURE — 99213 OFFICE O/P EST LOW 20 MIN: CPT | Performed by: INTERNAL MEDICINE

## 2024-04-16 PROCEDURE — 82784 ASSAY IGA/IGD/IGG/IGM EACH: CPT | Mod: PORLAB | Performed by: INTERNAL MEDICINE

## 2024-04-16 PROCEDURE — 3077F SYST BP >= 140 MM HG: CPT | Performed by: INTERNAL MEDICINE

## 2024-04-16 PROCEDURE — 85025 COMPLETE CBC W/AUTO DIFF WBC: CPT | Performed by: INTERNAL MEDICINE

## 2024-04-16 PROCEDURE — 36415 COLL VENOUS BLD VENIPUNCTURE: CPT | Performed by: INTERNAL MEDICINE

## 2024-04-16 PROCEDURE — 80053 COMPREHEN METABOLIC PANEL: CPT | Performed by: INTERNAL MEDICINE

## 2024-04-16 PROCEDURE — 4010F ACE/ARB THERAPY RXD/TAKEN: CPT | Performed by: INTERNAL MEDICINE

## 2024-04-16 PROCEDURE — 3078F DIAST BP <80 MM HG: CPT | Performed by: INTERNAL MEDICINE

## 2024-04-16 PROCEDURE — 82607 VITAMIN B-12: CPT | Performed by: INTERNAL MEDICINE

## 2024-04-16 ASSESSMENT — PAIN SCALES - GENERAL: PAINLEVEL: 0-NO PAIN

## 2024-04-16 NOTE — PROGRESS NOTES
Patient Visit Information:   Visit Type: Follow Up Visit      History of Present Illness:      ID Statement:    REGGIE BARRIGA is a 57 year old Female        Chief Complaint: IgM lambda monoclonal gammopathy,  B12 deficiency   Interval History:    1.  B12 deficiency #2 iron deficiency #3 gastric bypass #4 IgM monoclonal gammopathy              Patient is a 57-year-old female with history of  diabetes mellitus, hypertension, iron deficiency anemia, B12 deficiency anemia, s/p  gastric   bypass, she was recently evaluated because of sore throat, left ear pain and fever and diagnoses bronchitis.     Patient has long history of weakness, fatigue, muscular pain and has history of chronic anemia.  Patient is taking ferrous sulfate and B12 by mouth.  History of  weight gain approximately 25 pounds during past 6 months.  She is not very active, she still  smokes.  She has generalized weakness and fatigue.  She has history of anxiety and depression.     In June 2020 CBC dated showed WBC count 11.8, hemoglobin 11.8, hematocrit 40, MCV 79 and platelets 329.     CMP and LFT within normal limits, GUIDO negative, CRP 0.81 and sedimentation rate 47.     Serum protein electrophoresis and immunofixation did show IgM lambda monoclonal gammopathy, very small quantity of M spike, difficult to quantitate.     Patient is here for further evaluation.     Lab results discussed with the patient, medical records reviewed with patient.     Patient not compliant to medical care, no screening colonoscopy is done.   September 21, 2020     Patient has a history of iron deficiency anemia and B12 deficiency anemia and feeling better after IV Feraheme and B12 supplement.  Hemoglobin is stable,    Patient has IgM lambda monoclonal gammopathy with a due to MGUS which is stable      December 7, 2020     Patient is still complaining of weakness and fatigue, good appetite not doing regular exercise, he not working at this time.  Hemoglobin is  14.9.     March 1, 2021      Patient is feeling better today hemoglobin is 15.1, patient is receiving B12 injection every 4 weeks.  Good appetite, no nausea vomiting, no abdominal pain, still complaining of weakness and fatigue which is improving  June 21, 2021      Patient has a history of iron deficiency anemia and B12 deficiency anemia, patient receiving B12 injection every 4 weekly hemoglobin have been stable.  Patient is feeling better, something patient denied fatigue, good appetite, no nausea vomiting, no  abdominal pain, no diarrhea, no history of infection or bony pain.    4/16/24  Patient history of B12 deficiency anemia which have been resolved patient is receiving B12 injection.   Patient still has some weakness and fatigue.  Hemoglobin have been stable     No fever, good appetite no nausea vomiting, no abdominal pain, patient was complaining of weakness and fatigue still working full-time     History of IgM monoclonal gammopathy which also have been stable     Review of Systems:   Review of Systems:    Patient is very anxious,     Complaining of generalized weakness, fatigue, muscular pain     No headache and dizziness     No sore throat today, no fever     No chest pain, no shortness of breath, no cough     No nausea vomiting     No dyspepsia     Nonspecific abdominal pain     No diarrhea and constipation, no reactive bleed     No dysuria and hematuria     No vaginal discharge, status post hysterectomy     Patient not very active most times to home     She not doing regular exercise     She is gaining weight and she still smoke     No history of fall     Generalized weakness        Allergies and Intolerances:       Allergies:         ibuprofen: Drug, Unknown, Active         Latex: Latex, Hives/Urticaria, Active     Outpatient Medication Profile:  * Patient Currently Takes Medications as of 07-Dec-2022 12:58 documented in Structured Notes         Vitamin D3 : Last Dose Taken:  , orally once a day          Vitamin C: Last Dose Taken:  , orally once a day         multivitamin Multiple Vitamins oral capsule: Last Dose Taken:  , 1 cap(s)  oral once a day         Crestor 5 mg oral tablet: Last Dose Taken:  , 1 tab(s) orally once a  day         lisinopril 20 mg oral tablet: Last Dose Taken:  , 1 tab(s) orally once  a day         metoprolol: Last Dose Taken:               Medical History:         Smoking: ICD-10: F17.200, Status: Active         B12 deficiency anemia: ICD-10: D51.9, Status: Active         Iron deficiency anemia: ICD-10: D50.9, Status: Active         IgM monoclonal gammopathy of uncertain significance: ICD-10:  D47.2, Status: Active         Hypertension: ICD-10: I10, Status: Active         Diabetes mellitus: ICD-10: E11.9, Status: Active       Surg History:         Status post hysterectomy: ICD-10: Z90.710, Status: Active         Status post gastric bypass for obesity: ICD-10: Z98.84, Status:  Active     Family History: No Family History items are recorded  in the problem list.      Social History:   Social Substance History:  ·  Smoking Status current every day smoker            Vitals and Measurements:   Vitals: Temp: 36.7  HR: 77  RR: 16  BP: 140/84  SPO2%:   95   Measurements: HT(cm): 170.5  WT(kg): 98.9  BSA: 2.16   BMI:  34   Last 3 Weights & Heights: Date:                           Weight/Scale Type:                    Height:   07-Dec-2022 12:49                98.9  kg                     170.5  cm  16-Nov-2022 12:49                99.1  kg                     170.5  cm  21-Sep-2022 13:20                97.5  kg                     170.5  cm      Physical Exam:      Constitutional: awake/alert/oriented x3, no distress,   Eyes: PERRL, EOMI, clear sclera   ENMT: mucous membranes moist,   Head/Neck: Neck supple,   Respiratory/Thorax: Patent airways, CTAB, normal  breath sounds   Cardiovascular: Regular, rate and rhythm,  , normal  S 1and S 2   Gastrointestinal: Nondistended, soft,   nontender,      no  organomegaly, +BS,   Extremities: normal extremities, no cyanosis edema,   Neurological: alert and oriented x3,   Lymphatic: No significant lymphadenopathy   Psychological: Appropriate mood and behavior, very  anxious   Skin: Warm and dry, no lesions, no rashes         Lab Results:     ·  Results           Ref Range & Units 13:27  (4/16/24) 6 mo ago  (10/16/23) 1 yr ago  (4/15/23) 1 yr ago  (4/13/23) 1 yr ago  (4/12/23) 1 yr ago  (12/7/22) 1 yr ago  (6/1/22) 1 yr ago  (6/1/22)   WBC  4.4 - 11.3 x10*3/uL 9.3 9.2 CANCELED R, CM 9.1 R 12.1 High  R 9.5 R  9.4 R   RBC  4.00 - 5.20 x10*6/uL 4.93 5.20 CANCELED R, CM 4.99 R 5.32 High  R 5.16 R  5.18 R   Hemoglobin  12.0 - 16.0 g/dL 13.2 13.9 CANCELED R, CM 13.5 14.3 13.6  14.1   Hematocrit  36.0 - 46.0 % 41.2 43.8 CANCELED R, CM 41.9 44.1 43.3  43.7   MCV  80 - 100 fL 84 84 CANCELED R, CM 84 83 84  84   MCH  26.0 - 34.0 pg 26.8 26.7         MCHC  32.0 - 36.0 g/dL 32.0 31.7 Low  CANCELED R, CM 32.2 32.4 31.4 Low   32.3   RDW  11.5 - 14.5 % 13.0 13.1 CANCELED R, CM 13.0 13.1 12.9  13.0   Platelets  150 - 450 x10*3/uL 302 286                           Assessment and Plan:      Assessment and Plan:   Assessment:    #1  MGUS, IgM lambda monoclonal gammopathy     #2.  S/p gastric bypass , obesity  , BMI of 38.2,     #3 iron deficiency anemia     #4 B12 deficiency anemia     #5 diabetes mellitus     #6 hypertension     #7 anxiety        Patient is a 57-year-old female with a history of hypertension, diabetes mellitus, status post gastric bypass, history of iron deficiency anemia and B12 deficiency anemia was evaluated for possible bronchitis.     Serum protein electrophoresis and immunofixation did show IgM lambda monoclonal gammopathy.  Most probably we are dealing with the MGUS.  Renal function within normal limits, calcium level within normal limits, patient with chronic anemia due to iron  deficiency anemia, clinically I am not suspecting multiple myeloma.     Pathophysiology  paraproteinemia discussed with the patient and     #2 iron deficiency anemia, it is due to malabsorption, I will make a arrangement for IV Feraheme and check iron studies today     #3 B12 deficiency anemia, will make arrangements for B12 injections     #4 BMI 38.2, patient advised to have regular exercise, Klonopin intake none more than 2000, try to lose body weight at least by 50 pounds.     #4 smoking,   long discussion with the patient regarding possible complications of long-term smoking and suggested to stop smoking and   patient promised to do so.     Plan I will check IgG M level today and also check beta-2 microglobulin and serum light chain.  I will check iron studies and B12 level and make arrangement for IV Feraheme and B12 supplement     March 1, 2021     #1 iron deficiency anemia, hemoglobin is stable     #2 B12 deficiency anemia, continue B12 supplements every 4 weeks     #3 MGUS, IgM lambda monoclonal gammopathy, stable continue to monitor     #4 BMI 38, continued regular exercise, Attempted Now More Than 2000-calorie per Day.       Lab Discussed with the Patient, Continue B12 Supplement, repeat  CBC, Iron Studies, B12 after 3 Months     June 21, 2021        #1 iron deficiency anemia, hemoglobin is stable     #2 B12 deficiency anemia, continue B12 supplements every 4 weeks     #3 MGUS, IgM lambda monoclonal gammopathy, stable continue to monitor     #4 BMI 37, continued regular exercise, Attempted Now More Than 2000-calorie per Day.     Clinically patient is doing very well, hemoglobin is stable, patient will receive B12 injection today then changed B12 injection every 8 weeks.  I will check iron studies and B12 level today.         Reevaluation after 4 months.      Monitor CBC, iron studies and B12 level.      Again suggested to have regular exercise try to lose some body weight.     4/16/24        #1 iron deficiency anemia, hemoglobin is stable     #2 B12 deficiency anemia, continue B12 supplements  "every 4 weeks     #3 MGUS, IgM lambda monoclonal gammopathy, stable continue to monitor     #4 BMI 34, continued regular exercise, Attempted Now More Than 2000-calorie per Day.     Clinically stable, hemoglobin is stable, B12 injection every 12 weeks.      Repeat CBC after 3 months  ,check iron study and B12 level.  Follow-up 6 months  Plan:    Plan as above.      Follow-up after 6 months     Time spent 20 minutes            Note Recipients: Jonna Campoverde MD -  [phone]   Select \"Yes\" when ready to send to Provider(s) Listed Above:  Note sent to providers named above         "

## 2024-07-23 ENCOUNTER — APPOINTMENT (OUTPATIENT)
Dept: PRIMARY CARE | Facility: CLINIC | Age: 59
End: 2024-07-23
Payer: COMMERCIAL

## 2024-09-23 ENCOUNTER — APPOINTMENT (OUTPATIENT)
Dept: PRIMARY CARE | Facility: CLINIC | Age: 59
End: 2024-09-23
Payer: COMMERCIAL

## 2024-10-15 ENCOUNTER — APPOINTMENT (OUTPATIENT)
Dept: HEMATOLOGY/ONCOLOGY | Facility: CLINIC | Age: 59
End: 2024-10-15
Payer: COMMERCIAL

## 2024-10-16 ENCOUNTER — OFFICE VISIT (OUTPATIENT)
Dept: HEMATOLOGY/ONCOLOGY | Facility: CLINIC | Age: 59
End: 2024-10-16
Payer: COMMERCIAL

## 2024-10-16 ENCOUNTER — APPOINTMENT (OUTPATIENT)
Dept: CARDIOLOGY | Facility: HOSPITAL | Age: 59
End: 2024-10-16
Payer: COMMERCIAL

## 2024-10-16 ENCOUNTER — APPOINTMENT (OUTPATIENT)
Dept: HEMATOLOGY/ONCOLOGY | Facility: CLINIC | Age: 59
End: 2024-10-16
Payer: COMMERCIAL

## 2024-10-16 ENCOUNTER — APPOINTMENT (OUTPATIENT)
Dept: RADIOLOGY | Facility: HOSPITAL | Age: 59
End: 2024-10-16
Payer: COMMERCIAL

## 2024-10-16 ENCOUNTER — HOSPITAL ENCOUNTER (EMERGENCY)
Facility: HOSPITAL | Age: 59
Discharge: HOME | End: 2024-10-16
Attending: EMERGENCY MEDICINE
Payer: COMMERCIAL

## 2024-10-16 ENCOUNTER — INFUSION (OUTPATIENT)
Dept: HEMATOLOGY/ONCOLOGY | Facility: CLINIC | Age: 59
End: 2024-10-16
Payer: COMMERCIAL

## 2024-10-16 ENCOUNTER — OFFICE VISIT (OUTPATIENT)
Dept: CARDIOLOGY | Facility: HOSPITAL | Age: 59
End: 2024-10-16
Payer: COMMERCIAL

## 2024-10-16 VITALS
DIASTOLIC BLOOD PRESSURE: 83 MMHG | RESPIRATION RATE: 16 BRPM | HEART RATE: 84 BPM | TEMPERATURE: 98.1 F | WEIGHT: 220 LBS | BODY MASS INDEX: 33.45 KG/M2 | SYSTOLIC BLOOD PRESSURE: 160 MMHG | OXYGEN SATURATION: 97 %

## 2024-10-16 VITALS
HEIGHT: 68 IN | SYSTOLIC BLOOD PRESSURE: 166 MMHG | WEIGHT: 220 LBS | DIASTOLIC BLOOD PRESSURE: 76 MMHG | BODY MASS INDEX: 33.34 KG/M2 | HEART RATE: 76 BPM

## 2024-10-16 VITALS
RESPIRATION RATE: 16 BRPM | OXYGEN SATURATION: 99 % | HEART RATE: 64 BPM | BODY MASS INDEX: 33.34 KG/M2 | SYSTOLIC BLOOD PRESSURE: 163 MMHG | TEMPERATURE: 98 F | HEIGHT: 68 IN | DIASTOLIC BLOOD PRESSURE: 81 MMHG | WEIGHT: 220 LBS

## 2024-10-16 VITALS — RESPIRATION RATE: 16 BRPM

## 2024-10-16 DIAGNOSIS — E53.8 B12 DEFICIENCY: Primary | ICD-10-CM

## 2024-10-16 DIAGNOSIS — R00.2 PALPITATIONS: Primary | ICD-10-CM

## 2024-10-16 DIAGNOSIS — I10 HYPERTENSION, UNSPECIFIED TYPE: ICD-10-CM

## 2024-10-16 DIAGNOSIS — E53.8 B12 DEFICIENCY: ICD-10-CM

## 2024-10-16 DIAGNOSIS — G45.9 TIA (TRANSIENT ISCHEMIC ATTACK): ICD-10-CM

## 2024-10-16 DIAGNOSIS — I48.0 PAROXYSMAL ATRIAL FIBRILLATION (MULTI): Primary | Chronic | ICD-10-CM

## 2024-10-16 DIAGNOSIS — I48.91 ATRIAL FIBRILLATION, UNSPECIFIED TYPE (MULTI): Chronic | ICD-10-CM

## 2024-10-16 DIAGNOSIS — D47.2 MONOCLONAL GAMMOPATHY: ICD-10-CM

## 2024-10-16 DIAGNOSIS — E78.5 HYPERLIPIDEMIA, UNSPECIFIED HYPERLIPIDEMIA TYPE: ICD-10-CM

## 2024-10-16 LAB
ALBUMIN SERPL BCP-MCNC: 4.2 G/DL (ref 3.4–5)
ALP SERPL-CCNC: 108 U/L (ref 33–110)
ALT SERPL W P-5'-P-CCNC: 12 U/L (ref 7–45)
ANION GAP SERPL CALC-SCNC: 12 MMOL/L (ref 10–20)
AST SERPL W P-5'-P-CCNC: 12 U/L (ref 9–39)
ATRIAL RATE: 61 BPM
BASOPHILS # BLD AUTO: 0.08 X10*3/UL (ref 0–0.1)
BASOPHILS NFR BLD AUTO: 0.9 %
BILIRUB SERPL-MCNC: 0.4 MG/DL (ref 0–1.2)
BUN SERPL-MCNC: 16 MG/DL (ref 6–23)
CALCIUM SERPL-MCNC: 9.5 MG/DL (ref 8.6–10.3)
CARDIAC TROPONIN I PNL SERPL HS: 3 NG/L (ref 0–13)
CARDIAC TROPONIN I PNL SERPL HS: 4 NG/L (ref 0–13)
CHLORIDE SERPL-SCNC: 102 MMOL/L (ref 98–107)
CO2 SERPL-SCNC: 26 MMOL/L (ref 21–32)
CREAT SERPL-MCNC: 0.7 MG/DL (ref 0.5–1.05)
EGFRCR SERPLBLD CKD-EPI 2021: >90 ML/MIN/1.73M*2
EOSINOPHIL # BLD AUTO: 0.18 X10*3/UL (ref 0–0.7)
EOSINOPHIL NFR BLD AUTO: 2 %
ERYTHROCYTE [DISTWIDTH] IN BLOOD BY AUTOMATED COUNT: 13.8 % (ref 11.5–14.5)
GLUCOSE SERPL-MCNC: 180 MG/DL (ref 74–99)
HCT VFR BLD AUTO: 45.6 % (ref 36–46)
HGB BLD-MCNC: 14.2 G/DL (ref 12–16)
HOLD SPECIMEN: NORMAL
HOLD SPECIMEN: NORMAL
IGM SERPL-MCNC: 80 MG/DL (ref 40–230)
IMM GRANULOCYTES # BLD AUTO: 0.02 X10*3/UL (ref 0–0.7)
IMM GRANULOCYTES NFR BLD AUTO: 0.2 % (ref 0–0.9)
IRON SATN MFR SERPL: 15 % (ref 25–45)
IRON SERPL-MCNC: 63 UG/DL (ref 35–150)
LYMPHOCYTES # BLD AUTO: 2.22 X10*3/UL (ref 1.2–4.8)
LYMPHOCYTES NFR BLD AUTO: 24.8 %
MAGNESIUM SERPL-MCNC: 1.98 MG/DL (ref 1.6–2.4)
MCH RBC QN AUTO: 25.4 PG (ref 26–34)
MCHC RBC AUTO-ENTMCNC: 31.1 G/DL (ref 32–36)
MCV RBC AUTO: 82 FL (ref 80–100)
MONOCYTES # BLD AUTO: 0.67 X10*3/UL (ref 0.1–1)
MONOCYTES NFR BLD AUTO: 7.5 %
NEUTROPHILS # BLD AUTO: 5.78 X10*3/UL (ref 1.2–7.7)
NEUTROPHILS NFR BLD AUTO: 64.6 %
NRBC BLD-RTO: 0 /100 WBCS (ref 0–0)
P AXIS: 43 DEGREES
PLATELET # BLD AUTO: 314 X10*3/UL (ref 150–450)
POTASSIUM SERPL-SCNC: 4.4 MMOL/L (ref 3.5–5.3)
PR INTERVAL: 166 MS
PROT SERPL-MCNC: 7.4 G/DL (ref 6.4–8.2)
PROT SERPL-MCNC: 7.4 G/DL (ref 6.4–8.2)
Q ONSET: 251 MS
QRS COUNT: 10 BEATS
QRS DURATION: 88 MS
QT INTERVAL: 426 MS
QTC CALCULATION(BAZETT): 429 MS
QTC FREDERICIA: 428 MS
R AXIS: 9 DEGREES
RBC # BLD AUTO: 5.58 X10*6/UL (ref 4–5.2)
SODIUM SERPL-SCNC: 136 MMOL/L (ref 136–145)
T AXIS: 63 DEGREES
T OFFSET: 464 MS
TIBC SERPL-MCNC: 420 UG/DL (ref 240–445)
UIBC SERPL-MCNC: 357 UG/DL (ref 110–370)
VENTRICULAR RATE: 61 BPM
VIT B12 SERPL-MCNC: 181 PG/ML (ref 211–911)
WBC # BLD AUTO: 9 X10*3/UL (ref 4.4–11.3)

## 2024-10-16 PROCEDURE — 4010F ACE/ARB THERAPY RXD/TAKEN: CPT | Performed by: INTERNAL MEDICINE

## 2024-10-16 PROCEDURE — 99283 EMERGENCY DEPT VISIT LOW MDM: CPT

## 2024-10-16 PROCEDURE — 85025 COMPLETE CBC W/AUTO DIFF WBC: CPT | Performed by: EMERGENCY MEDICINE

## 2024-10-16 PROCEDURE — 2500000004 HC RX 250 GENERAL PHARMACY W/ HCPCS (ALT 636 FOR OP/ED): Mod: SE | Performed by: INTERNAL MEDICINE

## 2024-10-16 PROCEDURE — 83540 ASSAY OF IRON: CPT | Performed by: INTERNAL MEDICINE

## 2024-10-16 PROCEDURE — 36415 COLL VENOUS BLD VENIPUNCTURE: CPT | Performed by: EMERGENCY MEDICINE

## 2024-10-16 PROCEDURE — 99214 OFFICE O/P EST MOD 30 MIN: CPT | Performed by: INTERNAL MEDICINE

## 2024-10-16 PROCEDURE — 80053 COMPREHEN METABOLIC PANEL: CPT | Performed by: EMERGENCY MEDICINE

## 2024-10-16 PROCEDURE — 3077F SYST BP >= 140 MM HG: CPT | Performed by: INTERNAL MEDICINE

## 2024-10-16 PROCEDURE — 96372 THER/PROPH/DIAG INJ SC/IM: CPT

## 2024-10-16 PROCEDURE — 84484 ASSAY OF TROPONIN QUANT: CPT | Mod: 91 | Performed by: EMERGENCY MEDICINE

## 2024-10-16 PROCEDURE — 83521 IG LIGHT CHAINS FREE EACH: CPT | Mod: PORLAB | Performed by: INTERNAL MEDICINE

## 2024-10-16 PROCEDURE — 93005 ELECTROCARDIOGRAM TRACING: CPT | Mod: XU

## 2024-10-16 PROCEDURE — 83735 ASSAY OF MAGNESIUM: CPT | Performed by: EMERGENCY MEDICINE

## 2024-10-16 PROCEDURE — 84155 ASSAY OF PROTEIN SERUM: CPT | Mod: PORLAB | Performed by: INTERNAL MEDICINE

## 2024-10-16 PROCEDURE — 4004F PT TOBACCO SCREEN RCVD TLK: CPT | Performed by: INTERNAL MEDICINE

## 2024-10-16 PROCEDURE — 82607 VITAMIN B-12: CPT | Performed by: INTERNAL MEDICINE

## 2024-10-16 PROCEDURE — 3078F DIAST BP <80 MM HG: CPT | Performed by: INTERNAL MEDICINE

## 2024-10-16 PROCEDURE — 71045 X-RAY EXAM CHEST 1 VIEW: CPT | Performed by: RADIOLOGY

## 2024-10-16 PROCEDURE — 3008F BODY MASS INDEX DOCD: CPT | Performed by: INTERNAL MEDICINE

## 2024-10-16 PROCEDURE — 3079F DIAST BP 80-89 MM HG: CPT | Performed by: INTERNAL MEDICINE

## 2024-10-16 PROCEDURE — 71045 X-RAY EXAM CHEST 1 VIEW: CPT

## 2024-10-16 PROCEDURE — 82784 ASSAY IGA/IGD/IGG/IGM EACH: CPT | Mod: PORLAB | Performed by: INTERNAL MEDICINE

## 2024-10-16 RX ORDER — CYANOCOBALAMIN 1000 UG/ML
1000 INJECTION, SOLUTION INTRAMUSCULAR; SUBCUTANEOUS ONCE
OUTPATIENT
Start: 2025-01-01

## 2024-10-16 RX ORDER — FAMOTIDINE 10 MG/ML
20 INJECTION INTRAVENOUS ONCE AS NEEDED
OUTPATIENT
Start: 2025-01-01

## 2024-10-16 RX ORDER — CYANOCOBALAMIN 1000 UG/ML
1000 INJECTION, SOLUTION INTRAMUSCULAR; SUBCUTANEOUS ONCE
Status: COMPLETED | OUTPATIENT
Start: 2024-10-16 | End: 2024-10-16

## 2024-10-16 RX ORDER — ROSUVASTATIN CALCIUM 5 MG/1
5 TABLET, COATED ORAL DAILY
Qty: 90 TABLET | Refills: 3 | Status: SHIPPED | OUTPATIENT
Start: 2024-10-16 | End: 2025-10-16

## 2024-10-16 RX ORDER — ALBUTEROL SULFATE 0.83 MG/ML
3 SOLUTION RESPIRATORY (INHALATION) AS NEEDED
Status: CANCELLED | OUTPATIENT
Start: 2024-10-16

## 2024-10-16 RX ORDER — ALBUTEROL SULFATE 0.83 MG/ML
3 SOLUTION RESPIRATORY (INHALATION) AS NEEDED
OUTPATIENT
Start: 2025-01-01

## 2024-10-16 RX ORDER — LISINOPRIL 10 MG/1
15 TABLET ORAL DAILY
Qty: 45 TABLET | Refills: 11 | Status: SHIPPED | OUTPATIENT
Start: 2024-10-16 | End: 2025-10-16

## 2024-10-16 RX ORDER — DIPHENHYDRAMINE HYDROCHLORIDE 50 MG/ML
50 INJECTION INTRAMUSCULAR; INTRAVENOUS AS NEEDED
OUTPATIENT
Start: 2025-01-01

## 2024-10-16 RX ORDER — METOPROLOL SUCCINATE 25 MG/1
25 TABLET, EXTENDED RELEASE ORAL DAILY
Qty: 30 TABLET | Refills: 11 | Status: SHIPPED | OUTPATIENT
Start: 2024-10-16 | End: 2025-10-16

## 2024-10-16 RX ORDER — CYANOCOBALAMIN 1000 UG/ML
1000 INJECTION, SOLUTION INTRAMUSCULAR; SUBCUTANEOUS ONCE
Status: CANCELLED | OUTPATIENT
Start: 2024-10-16

## 2024-10-16 RX ORDER — EPINEPHRINE 0.3 MG/.3ML
0.3 INJECTION SUBCUTANEOUS EVERY 5 MIN PRN
OUTPATIENT
Start: 2025-01-01

## 2024-10-16 RX ORDER — FAMOTIDINE 10 MG/ML
20 INJECTION INTRAVENOUS ONCE AS NEEDED
Status: CANCELLED | OUTPATIENT
Start: 2024-10-16

## 2024-10-16 RX ORDER — DIPHENHYDRAMINE HYDROCHLORIDE 50 MG/ML
50 INJECTION INTRAMUSCULAR; INTRAVENOUS AS NEEDED
Status: CANCELLED | OUTPATIENT
Start: 2024-10-16

## 2024-10-16 RX ORDER — EPINEPHRINE 0.3 MG/.3ML
0.3 INJECTION SUBCUTANEOUS EVERY 5 MIN PRN
Status: CANCELLED | OUTPATIENT
Start: 2024-10-16

## 2024-10-16 ASSESSMENT — COLUMBIA-SUICIDE SEVERITY RATING SCALE - C-SSRS
1. IN THE PAST MONTH, HAVE YOU WISHED YOU WERE DEAD OR WISHED YOU COULD GO TO SLEEP AND NOT WAKE UP?: NO
6. HAVE YOU EVER DONE ANYTHING, STARTED TO DO ANYTHING, OR PREPARED TO DO ANYTHING TO END YOUR LIFE?: NO
2. HAVE YOU ACTUALLY HAD ANY THOUGHTS OF KILLING YOURSELF?: NO

## 2024-10-16 ASSESSMENT — PAIN SCALES - GENERAL
PAINLEVEL_OUTOF10: 0 - NO PAIN
PAINLEVEL_OUTOF10: 0 - NO PAIN
PAINLEVEL_OUTOF10: 0-NO PAIN
PAINLEVEL_OUTOF10: 0 - NO PAIN

## 2024-10-16 ASSESSMENT — PAIN - FUNCTIONAL ASSESSMENT: PAIN_FUNCTIONAL_ASSESSMENT: 0-10

## 2024-10-16 NOTE — PATIENT INSTRUCTIONS
Follow up with Dr. Villalba for MGUS and iron deficiency anemia.     Return in 6 months for routine follow up.     Continue vitamin B12 injections every 3 months.

## 2024-10-16 NOTE — PROGRESS NOTES
"Chief Complaint:   No chief complaint on file.     History Of Present Illness:    Rosalba Carter is a 59 y.o. female presenting post ER visit.  She has a history of moderate nonobstructive CAD, morbid obesity s/p gastric bypass surgery, pAfib (she was not on OAC due to anemia with very low Fe - cleared by hematology and started on Eliquis), TIA, diabetes (diet controlled), anemia with Fe and Vit B12 injections, monoclonal gammopathy, IBS, ? allergic bronchitis, and lipoma.  [facility] pain, had an abnormal stress test, and underwent LHC 4/14/23 which demonstrated angiographically moderate LAD disease, no PCI performed.    Rosalba presents today after ED visit due to palpitations and malaise.  She has not felt well since last weekend.  Today was on her way to work when she felt like her heart was pounding out of her chest. - her wrist BP cuff indicated \"Afib\" rhythm with HR in the 110's, prompting her to go to the ED.  Upon arrival in ED, her BP was significantly elevated, and her rhythm was reported as sinus (I do not see EKG for personal review) - she ended up leaving the ED and was scheduled to come to the office today.    Her EKG in office today demonstrates NSR.  She denies chest pain, dizziness, SOB, fever/chills.  Her BP is elevated at 166/76.    ROS:  The remainder of the review of systems was obtained, as was negative as pertains to the chief complaint.    Last Recorded Vitals:  Vitals:    10/16/24 1414   BP: 166/76   Pulse: 76   Weight: 99.8 kg (220 lb)   Height: 1.727 m (5' 8\")       Past Medical History:  She has a past medical history of B12 deficiency (10/16/2023).    Past Surgical History:  She has a past surgical history that includes Other surgical history (11/09/2020); Other surgical history (11/09/2020); Other surgical history (11/09/2020); Other surgical history (11/09/2020); Other surgical history (11/09/2020); and Other surgical history " (01/12/2021).      Social History:  She reports that she has been smoking cigarettes. She has a 40 pack-year smoking history. She has never used smokeless tobacco. She reports that she does not drink alcohol and does not use drugs.    Family History:  Family History   Problem Relation Name Age of Onset    Hypertension Mother      Diabetes Mother      Coronary artery disease Mother      Heart attack Mother      Heart attack Father      Hypertension Father      Coronary artery disease Father      Stroke Father      Heart attack Sister      Stroke Brother          Allergies:  Carvedilol, Ibuprofen, and Latex    Outpatient Medications:  Current Outpatient Medications   Medication Instructions    albuterol 90 mcg/actuation inhaler 2 puffs, Every 4 hours PRN    amitriptyline (ELAVIL) 25 mg, oral, Nightly    amLODIPine (Norvasc) 5 mg tablet 1 tablet, Daily    apixaban (ELIQUIS) 5 mg, oral, 2 times daily    ascorbic acid (VITAMIN C) 100 mg, Daily    cholecalciferol (Vitamin D-3) 10 MCG (400 UNIT) tablet 1 tablet, Daily    cyanocobalamin (VITAMIN B-12) 1,000 mcg, Every 30 days    ferumoxytol (FERAHEME) 510 mg    fluticasone (Flonase) 50 mcg/actuation nasal spray 1 spray, Daily    lisinopril 10 mg, oral, Daily    metoprolol succinate XL (TOPROL-XL) 12.5 mg, oral, Daily    multivitamin tablet 1 tablet, Daily    rosuvastatin (CRESTOR) 5 mg, oral, Daily    traZODone (DESYREL) 100 mg, oral, Nightly       Physical Exam:  Physical Exam  HENT:      Head: Normocephalic.      Nose: Nose normal.      Mouth/Throat:      Mouth: Mucous membranes are moist.   Cardiovascular:      Rate and Rhythm: Normal rate and regular rhythm.      Comments: 1/6 systolic murmur  Trivial swelling in right ankle  Pulmonary:      Effort: Pulmonary effort is normal.      Breath sounds: Normal breath sounds.   Abdominal:      Palpations: Abdomen is soft.   Musculoskeletal:         General: Normal range of motion.      Cervical back: Normal range of motion.    Skin:     General: Skin is warm and dry.   Neurological:      General: No focal deficit present.      Mental Status: She is alert.   Psychiatric:         Mood and Affect: Mood normal.            Last Labs:  CBC -  Lab Results   Component Value Date    WBC 9.0 10/16/2024    HGB 14.2 10/16/2024    HCT 45.6 10/16/2024    MCV 82 10/16/2024     10/16/2024       CMP -  Lab Results   Component Value Date    CALCIUM 9.5 10/16/2024    PROT 7.4 10/16/2024    ALBUMIN 4.2 10/16/2024    AST 12 10/16/2024    ALT 12 10/16/2024    ALKPHOS 108 10/16/2024    BILITOT 0.4 10/16/2024       LIPID PANEL -   Lab Results   Component Value Date    CHOL 177 04/13/2023    TRIG 86 04/13/2023    HDL 41.3 04/13/2023    CHHDL 4.3 04/13/2023    LDLF 119 (H) 04/13/2023    VLDL 17 04/13/2023       RENAL FUNCTION PANEL -   Lab Results   Component Value Date    GLUCOSE 180 (H) 10/16/2024     10/16/2024    K 4.4 10/16/2024     10/16/2024    CO2 26 10/16/2024    ANIONGAP 12 10/16/2024    BUN 16 10/16/2024    CREATININE 0.70 10/16/2024    GFRMALE CANCELED 04/15/2023    CALCIUM 9.5 10/16/2024    ALBUMIN 4.2 10/16/2024        Lab Results   Component Value Date    HGBA1C 7.5 (H) 08/05/2024       Last Cardiology Tests:  ECG:  ECG 12 lead 10/16/2024 (Preliminary)      Assessment/Plan   Palpitations  History of pAfib  Moderate nonobstructive CAD  TIA  Diabetes (diet controlled)  Anemia with Fe and Vit B12 injections  Monoclonal gammopathy  IBS  ? allergic bronchitis  Lipoma    Presentation concerning for pAfib and uncontrolled hypertension.    Plan:  -check 2 week holter  -increase metop XL to 25mg daily  -increase lisinopril to 15mg daily  -to keep 2 week HR/BP log and contact office with values  -RTC to see FANNY in one month

## 2024-10-16 NOTE — ED PROVIDER NOTES
Patient HPI   Chief Complaint   Patient presents with    Chest Pain     Hx afib       Patient presents the emergency department secondary to chest discomfort palpitations and malaise.  Symptoms started this morning but have been going on intermittently over the last few days.  She has a history of paroxysmal atrial fibrillation.  She feels like she has been having multiple episodes of atrial fibs with high heart rates over the last few days.  She is anticoagulated on Eliquis.  She does believe that she took her medication this morning but is not entirely sure because it is a habit for her.  She was talking to her sister this morning so she is unsure if she truly took her meds or not but she believes she did.  Patient symptoms have now resolved.  No chest pain or shortness of breath.  No abdominal pain.  No nausea or vomiting.  No change in bowel movements.  No other complaints currently.                          Michaela Coma Scale Score: 15                  Patient History   Past Medical History:   Diagnosis Date    B12 deficiency 10/16/2023     Past Surgical History:   Procedure Laterality Date    OTHER SURGICAL HISTORY  2020     section    OTHER SURGICAL HISTORY  2020    Liz-en-Y gastric bypass    OTHER SURGICAL HISTORY  2020    Hernia repair    OTHER SURGICAL HISTORY  2020    Abdominoplasty    OTHER SURGICAL HISTORY  2020    Hysterectomy    OTHER SURGICAL HISTORY  2021    Lipoma excision     Family History   Problem Relation Name Age of Onset    Hypertension Mother      Diabetes Mother      Coronary artery disease Mother      Heart attack Mother      Heart attack Father      Hypertension Father      Coronary artery disease Father      Stroke Father      Heart attack Sister      Stroke Brother       Social History     Tobacco Use    Smoking status: Every Day     Current packs/day: 1.00     Average packs/day: 1 pack/day for 40.0 years (40.0 ttl pk-yrs)     Types:  Cigarettes    Smokeless tobacco: Never   Vaping Use    Vaping status: Never Used   Substance Use Topics    Alcohol use: Never    Drug use: Never       Physical Exam   ED Triage Vitals [10/16/24 0744]   Temperature Heart Rate Respirations BP   36.7 °C (98 °F) (!) 8 18 (!) 210/105      Pulse Ox Temp src Heart Rate Source Patient Position   97 % -- -- --      BP Location FiO2 (%)     -- --       Physical Exam  Vitals and nursing note reviewed.   Constitutional:       General: She is not in acute distress.     Appearance: Normal appearance. She is well-developed. She is not ill-appearing.   HENT:      Head: Normocephalic and atraumatic.      Right Ear: Tympanic membrane normal.      Left Ear: Tympanic membrane normal.      Nose: Nose normal.      Mouth/Throat:      Mouth: Mucous membranes are moist.   Eyes:      Extraocular Movements: Extraocular movements intact.      Pupils: Pupils are equal, round, and reactive to light.   Cardiovascular:      Rate and Rhythm: Normal rate and regular rhythm.      Pulses: Normal pulses.      Heart sounds: Normal heart sounds. No murmur heard.  Pulmonary:      Effort: Pulmonary effort is normal.      Breath sounds: Normal breath sounds. No decreased breath sounds, wheezing, rhonchi or rales.   Chest:      Chest wall: No tenderness or crepitus.   Abdominal:      General: Abdomen is flat. Bowel sounds are normal.      Palpations: Abdomen is soft.   Musculoskeletal:         General: Normal range of motion.      Cervical back: Normal range of motion.      Right lower leg: No tenderness. No edema.      Left lower leg: No tenderness. No edema.   Skin:     General: Skin is warm and dry.      Capillary Refill: Capillary refill takes less than 2 seconds.   Neurological:      General: No focal deficit present.      Mental Status: She is alert and oriented to person, place, and time.   Psychiatric:         Mood and Affect: Mood normal.         Behavior: Behavior normal.       Labs Reviewed   CBC  WITH AUTO DIFFERENTIAL - Abnormal       Result Value    WBC 9.0      nRBC 0.0      RBC 5.58 (*)     Hemoglobin 14.2      Hematocrit 45.6      MCV 82      MCH 25.4 (*)     MCHC 31.1 (*)     RDW 13.8      Platelets 314      Neutrophils % 64.6      Immature Granulocytes %, Automated 0.2      Lymphocytes % 24.8      Monocytes % 7.5      Eosinophils % 2.0      Basophils % 0.9      Neutrophils Absolute 5.78      Immature Granulocytes Absolute, Automated 0.02      Lymphocytes Absolute 2.22      Monocytes Absolute 0.67      Eosinophils Absolute 0.18      Basophils Absolute 0.08     COMPREHENSIVE METABOLIC PANEL - Abnormal    Glucose 180 (*)     Sodium 136      Potassium 4.4      Chloride 102      Bicarbonate 26      Anion Gap 12      Urea Nitrogen 16      Creatinine 0.70      eGFR >90      Calcium 9.5      Albumin 4.2      Alkaline Phosphatase 108      Total Protein 7.4      AST 12      Bilirubin, Total 0.4      ALT 12     MAGNESIUM - Normal    Magnesium 1.98     SERIAL TROPONIN-INITIAL - Normal    Troponin I, High Sensitivity 4      Narrative:     Less than 99th percentile of normal range cutoff-  Female and children under 18 years old <14 ng/L; Male <21 ng/L: Negative  Repeat testing should be performed if clinically indicated.     Female and children under 18 years old 14-50 ng/L; Male 21-50 ng/L:  Consistent with possible cardiac damage and possible increased clinical   risk. Serial measurements may help to assess extent of myocardial damage.     >50 ng/L: Consistent with cardiac damage, increased clinical risk and  myocardial infarction. Serial measurements may help assess extent of   myocardial damage.      NOTE: Children less than 1 year old may have higher baseline troponin   levels and results should be interpreted in conjunction with the overall   clinical context.     NOTE: Troponin I testing is performed using a different   testing methodology at [facility] than at other   [facility]. Direct  result comparisons should only   be made within the same method.   SERIAL TROPONIN, 1 HOUR - Normal    Troponin I, High Sensitivity 3      Narrative:     Less than 99th percentile of normal range cutoff-  Female and children under 18 years old <14 ng/L; Male <21 ng/L: Negative  Repeat testing should be performed if clinically indicated.     Female and children under 18 years old 14-50 ng/L; Male 21-50 ng/L:  Consistent with possible cardiac damage and possible increased clinical   risk. Serial measurements may help to assess extent of myocardial damage.     >50 ng/L: Consistent with cardiac damage, increased clinical risk and  myocardial infarction. Serial measurements may help assess extent of   myocardial damage.      NOTE: Children less than 1 year old may have higher baseline troponin   levels and results should be interpreted in conjunction with the overall   clinical context.     NOTE: Troponin I testing is performed using a different   testing methodology at [facility] than at other   [facility]. Direct result comparisons should only   be made within the same method.   TROPONIN SERIES- (INITIAL, 1 HR)    Narrative:     The following orders were created for panel order Troponin I Series, High Sensitivity (0, 1 HR).  Procedure                               Abnormality         Status                     ---------                               -----------         ------                     Troponin I, High Sensiti...[[id number]]  Normal              Final result               Troponin, High Sensitivi...[[id number]]  Normal              Final result                 Please view results for these tests on the individual orders.     Pain Management Panel           No data to display              XR chest 1 view   Final Result   No radiographic evidence of acute cardiopulmonary process.             I personally reviewed the images/study and I agree with the findings   as stated by Radiology resident  Dr. Barraza.        MACRO:   None        Signed by: Johndaquancosme Michelle 10/16/2024 10:06 AM   Dictation workstation:   [id number]        ED Course & MDM   Diagnoses as of 10/16/24 1014   Palpitations       Medical Decision Making  Patient presents emergency department secondary to episodes of shortness of breath malaise.  Patient has a differential diagnosis of paroxysmal atrial fibrillation not well-controlled.  Electrolyte abnormality is also a possibility.  Patient is evaluated in the emergency room with EKG chest x-ray and laboratory workup.  EKG was performed at 7:53 AM.  It is sinus rhythm rate of 70.  No acute ST elevation.  NM interval is 164 and QTc is 420.  Patient's laboratory workup is unremarkable with a negative initial troponin.  No acute electrolyte abnormalities.  Repeat EKG was obtained at 9:18 AM.  It is sinus rhythm rate of 61.  No acute ST elevation.  NM interval is 166 and QTc is 429.  Patient second troponin is also negative.  I did offer the patient admission to the hospital because of frequent symptoms.  At this time she is declining this.  She is going to follow-up with her cardiologist as an outpatient.  She is stable for discharge at this time.  She will follow-up with cardiology return here for new or worsening symptoms.        Procedure  Procedures     Tess Rodney MD  10/16/24 1014

## 2024-10-16 NOTE — PATIENT INSTRUCTIONS
Thanks for following up in office today.    1)  I am going to have you take the whole pill of the metoprolol 25 mg a day.  I am also increasing your lisinopril to 15 mg daily.  I have also sent in a script for the eliquis 5 mg twice a day. I want you to keep a log of your heart rates and blood pressure that you take a couple times a day. Call our office with your readings in two weeks.     2)  I am going to have you wear a monitor for two weeks to see what your heart rates and rhythm is.    3)  Please continue your cardiac medications as prescribed.    Follow up with JARED marley NP in one month   If you have any questions, please call [phone] and choose option for Dr. Irvin's nurse Belinda Hammer

## 2024-10-16 NOTE — PROGRESS NOTES
Patient Visit Information:   Visit Type: Follow Up Visit      History of Present Illness:      ID Statement:    REGGIE BARRIGA is a 57 year old Female                                                                          Reggie Barriga     1965  MRN: [id number]        Chief Complaint: IgM lambda monoclonal gammopathy,  B12 deficiency   Interval History:    Diagnosis 1.  B12 deficiency #2 iron deficiency #3 gastric bypass #4 IgM monoclonal gammopathy              Patient is a 59-year-old female with history of  diabetes mellitus, hypertension, iron deficiency anemia, B12 deficiency anemia, s/p  gastric   bypass, she was recently evaluated because of sore throat, left ear pain and fever and diagnoses bronchitis.     Patient has long history of weakness, fatigue, muscular pain and has history of chronic anemia.  Patient is taking ferrous sulfate and B12 by mouth.  History of  weight gain approximately 25 pounds during past 6 months.  She is not very active, she still  smokes.  She has generalized weakness and fatigue.  She has history of anxiety and depression.     In June 2020 CBC dated showed WBC count 11.8, hemoglobin 11.8, hematocrit 40, MCV 79 and platelets 329.     CMP and LFT within normal limits, GUIDO negative, CRP 0.81 and sedimentation rate 47.     Serum protein electrophoresis and immunofixation did show IgM lambda monoclonal gammopathy, very small quantity of M spike, difficult to quantitate.     Patient is here for further evaluation.     Lab results discussed with the patient, medical records reviewed with patient.     Patient not compliant to medical care, no screening colonoscopy is done.   September 21, 2020     Patient has a history of iron deficiency anemia and B12 deficiency anemia and feeling better after IV Feraheme and B12 supplement.  Hemoglobin is stable,    Patient has IgM lambda monoclonal gammopathy with a due to MGUS which is stable      December 7, 2020     Patient  is still complaining of weakness and fatigue, good appetite not doing regular exercise, he not working at this time.  Hemoglobin is 14.9.     March 1, 2021      Patient is feeling better today hemoglobin is 15.1, patient is receiving B12 injection every 4 weeks.  Good appetite, no nausea vomiting, no abdominal pain, still complaining of weakness and fatigue which is improving  June 21, 2021      Patient has a history of iron deficiency anemia and B12 deficiency anemia, patient receiving B12 injection every 4 weekly hemoglobin have been stable.  Patient is feeling better, something patient denied fatigue, good appetite, no nausea vomiting, no  abdominal pain, no diarrhea, no history of infection or bony pain.    10/16/24  Patient history of B12 deficiency anemia which have been resolved patient is receiving B12 injection.   Patient still has some weakness and fatigue.  Hemoglobin have been stable.   No fever, good appetite no nausea vomiting, no abdominal pain, patient was complaining of weakness and fatigue still working full-time.   History of IgM monoclonal gammopathy which also have been stable  Today hemoglobin 14.2  Review of Systems:   Review of Systems:    Patient is very anxious,     Complaining of generalized weakness, fatigue, muscular pain     No headache and dizziness     No sore throat today, no fever     No chest pain, no shortness of breath, no cough     No nausea vomiting     No dyspepsia     Nonspecific abdominal pain     No diarrhea and constipation, no reactive bleed     No dysuria and hematuria     No vaginal discharge, status post hysterectomy     Patient not very active most times to home     She not doing regular exercise     She is gaining weight and she still smoke     No history of fall     Generalized weakness        Allergies and Intolerances:       Allergies:         ibuprofen: Drug, Unknown, Active         Latex: Latex, Hives/Urticaria, Active     Outpatient Medication Profile:  *  Patient Currently Takes Medications as of 07-Dec-2022 12:58 documented in Structured Notes         Vitamin D3 : Last Dose Taken:  , orally once a day         Vitamin C: Last Dose Taken:  , orally once a day         multivitamin Multiple Vitamins oral capsule: Last Dose Taken:  , 1 cap(s)  oral once a day         Crestor 5 mg oral tablet: Last Dose Taken:  , 1 tab(s) orally once a  day         lisinopril 20 mg oral tablet: Last Dose Taken:  , 1 tab(s) orally once  a day         metoprolol: Last Dose Taken:               Medical History:         Smoking: ICD-10: F17.200, Status: Active         B12 deficiency anemia: ICD-10: D51.9, Status: Active         Iron deficiency anemia: ICD-10: D50.9, Status: Active         IgM monoclonal gammopathy of uncertain significance: ICD-10:  D47.2, Status: Active         Hypertension: ICD-10: I10, Status: Active         Diabetes mellitus: ICD-10: E11.9, Status: Active       Surg History:         Status post hysterectomy: ICD-10: Z90.710, Status: Active         Status post gastric bypass for obesity: ICD-10: Z98.84, Status:  Active     Family History: No Family History items are recorded  in the problem list.      Social History:   Social Substance History:  ·  Smoking Status current every day smoker            Vitals and Measurements:   Vitals: Temp: 36.7  HR: 77  RR: 16  BP: 140/84  SPO2%:   95   Measurements: HT(cm): 170.5  WT(kg): 98.9  BSA: 2.16   BMI:  34   Last 3 Weights & Heights: Date:                           Weight/Scale Type:                    Height:   07-Dec-2022 12:49                98.9  kg                     170.5  cm  16-Nov-2022 12:49                99.1  kg                     170.5  cm  21-Sep-2022 13:20                97.5  kg                     170.5  cm      Physical Exam:      Constitutional: awake/alert/oriented x3, no distress,   Eyes: PERRL, EOMI, clear sclera   ENMT: mucous membranes moist,   Head/Neck: Neck supple,   Respiratory/Thorax: Patent airways,  CTAB, normal  breath sounds   Cardiovascular: Regular, rate and rhythm,  , normal  S 1and S 2   Gastrointestinal: Nondistended, soft,   nontender,      no organomegaly, +BS,   Extremities: normal extremities, no cyanosis edema,   Neurological: alert and oriented x3,   Lymphatic: No significant lymphadenopathy   Psychological: Appropriate mood and behavior, very  anxious   Skin: Warm and dry, no lesions, no rashes         Lab Results:     ·  Results         8:10  (10/16/24) 6 mo ago  (4/16/24) 1 yr ago  (10/16/23) 1 yr ago  (4/15/23) 1 yr ago  (4/13/23) 1 yr ago  (4/12/23) 1 yr ago  (12/7/22)    WBC  4.4 - 11.3 x10*3/uL 9.0 9.3 9.2 CANCELED R, CM 9.1 R 12.1 High  R 9.5 R   nRBC  0.0 - 0.0 /100 WBCs 0.0   CANCELED R, CM      RBC  4.00 - 5.20 x10*6/uL 5.58 High  4.93 5.20 CANCELED R, CM 4.99 R 5.32 High  R 5.16 R   Hemoglobin  12.0 - 16.0 g/dL 14.2 13.2 13.9 CANCELED R, CM 13.5 14.3 13.6   Hematocrit  36.0 - 46.0 % 45.6 41.2 43.8 CANCELED R, CM 41.9 44.1 43.3   MCV  80 - 100 fL 82 84 84 CANCELED R, CM 84 83 84   MCH  26.0 - 34.0 pg 25.4 Low  26.8 26.7       MCHC  32.0 - 36.0 g/dL 31.1 Low  32.0 31.7 Low  CANCELED R, CM 32.2 32.4 31.4 Low    RDW  11.5 - 14.5 % 13.8 13.0 13.1 CANCELED R, CM 13.0 13.1 12.9   Platelets  150 - 450 x10*3/uL 314                             Assessment and Plan:      Assessment and Plan:   Assessment:    #1  MGUS, IgM lambda monoclonal gammopathy     #2.  S/p gastric bypass , obesity  , BMI of 38.2,     #3 iron deficiency anemia     #4 B12 deficiency anemia     #5 diabetes mellitus     #6 hypertension     #7 anxiety        Patient is a 57-year-old female with a history of hypertension, diabetes mellitus, status post gastric bypass, history of iron deficiency anemia and B12 deficiency anemia was evaluated for possible bronchitis.     Serum protein electrophoresis and immunofixation did show IgM lambda monoclonal gammopathy.  Most probably we are dealing with the MGUS.  Renal function within  normal limits, calcium level within normal limits, patient with chronic anemia due to iron  deficiency anemia, clinically I am not suspecting multiple myeloma.     Pathophysiology paraproteinemia discussed with the patient and     #2 iron deficiency anemia, it is due to malabsorption, I will make a arrangement for IV Feraheme and check iron studies today     #3 B12 deficiency anemia, will make arrangements for B12 injections     #4 BMI 38.2, patient advised to have regular exercise, Klonopin intake none more than 2000, try to lose body weight at least by 50 pounds.     #4 smoking,   long discussion with the patient regarding possible complications of long-term smoking and suggested to stop smoking and   patient promised to do so.     Plan I will check IgG M level today and also check beta-2 microglobulin and serum light chain.  I will check iron studies and B12 level and make arrangement for IV Feraheme and B12 supplement     March 1, 2021     #1 iron deficiency anemia, hemoglobin is stable     #2 B12 deficiency anemia, continue B12 supplements every 4 weeks     #3 MGUS, IgM lambda monoclonal gammopathy, stable continue to monitor     #4 BMI 38, continued regular exercise, Attempted Now More Than 2000-calorie per Day.       Lab Discussed with the Patient, Continue B12 Supplement, repeat  CBC, Iron Studies, B12 after 3 Months     June 21, 2021        #1 iron deficiency anemia, hemoglobin is stable     #2 B12 deficiency anemia, continue B12 supplements every 4 weeks     #3 MGUS, IgM lambda monoclonal gammopathy, stable continue to monitor     #4 BMI 37, continued regular exercise, Attempted Now More Than 2000-calorie per Day.     Clinically patient is doing very well, hemoglobin is stable, patient will receive B12 injection today then changed B12 injection every 8 weeks.  I will check iron studies and B12 level today.         Reevaluation after 4 months.      Monitor CBC, iron studies and B12 level.      Again  suggested to have regular exercise try to lose some body weight.     10/16/24        #1 iron deficiency anemia, hemoglobin is stable     #2 B12 deficiency anemia, continue B12 supplements every 4 weeks     #3 MGUS, IgM lambda monoclonal gammopathy, stable continue to monitor     #4 BMI 34, continued regular exercise, Attempted Now More Than 2000-calorie per Day.     Clinically stable, hemoglobin is stable, B12 injection every 12 weeks.      Repeat CBC after 3 months  ,check iron study and B12 level.  Follow-up 6 months  Plan:    Plan as above.      Follow-up after 6 months     Time spent 20 minutes

## 2024-10-17 LAB
KAPPA LC SERPL-MCNC: 3.43 MG/DL (ref 0.33–1.94)
KAPPA LC/LAMBDA SER: 2.1 {RATIO} (ref 0.26–1.65)
LAMBDA LC SERPL-MCNC: 1.63 MG/DL (ref 0.57–2.63)

## 2024-10-21 LAB
ALBUMIN: 4.2 G/DL (ref 3.4–5)
ALPHA 1 GLOBULIN: 0.3 G/DL (ref 0.2–0.6)
ALPHA 2 GLOBULIN: 0.9 G/DL (ref 0.4–1.1)
BETA GLOBULIN: 1 G/DL (ref 0.5–1.2)
GAMMA GLOBULIN: 0.9 G/DL (ref 0.5–1.4)
IMMUNOFIXATION COMMENT: NORMAL
PATH REVIEW - SERUM IMMUNOFIXATION: NORMAL
PATH REVIEW-SERUM PROTEIN ELECTROPHORESIS: NORMAL
PROTEIN ELECTROPHORESIS COMMENT: NORMAL

## 2024-10-23 ENCOUNTER — ANCILLARY PROCEDURE (OUTPATIENT)
Dept: CARDIOLOGY | Facility: HOSPITAL | Age: 59
End: 2024-10-23
Payer: COMMERCIAL

## 2024-10-23 DIAGNOSIS — I10 HYPERTENSION, UNSPECIFIED TYPE: ICD-10-CM

## 2024-10-23 DIAGNOSIS — I48.0 PAROXYSMAL ATRIAL FIBRILLATION (MULTI): Chronic | ICD-10-CM

## 2024-10-23 PROCEDURE — 93246 EXT ECG>7D<15D RECORDING: CPT

## 2024-10-23 PROCEDURE — 93248 EXT ECG>7D<15D REV&INTERPJ: CPT | Performed by: INTERNAL MEDICINE

## 2024-10-23 NOTE — PROGRESS NOTES
Dominga placed a Holter on the patient on 10/16/24    Patient was told what can and can not be done while wearing the 14 day monitor     Patient showed understanding

## 2024-11-15 ENCOUNTER — TELEPHONE (OUTPATIENT)
Dept: CARDIOLOGY | Facility: HOSPITAL | Age: 59
End: 2024-11-15
Payer: COMMERCIAL

## 2024-11-15 DIAGNOSIS — I47.10 PAROXYSMAL SVT (SUPRAVENTRICULAR TACHYCARDIA) (CMS-HCC): Primary | ICD-10-CM

## 2024-11-15 NOTE — TELEPHONE ENCOUNTER
Dr. Irvin reviewed her monitor results.  She had some SVT.  Dr. Irvin is recommending a referral to Electrophysiology (EP).      Attempted to call results but had to leave a message.  Asked her to call back for results.

## 2024-11-21 NOTE — PROGRESS NOTES
"Primary Cardiologist: Dr. Irvin    Chief Complaint:   Follow-up ( holter monitor)     History Of Present Illness:    Rosalba Carter is a 59 y.o. female with past medical history of moderate nonobstructive CAD, morbid obesity s/p gastric bypass surgery, pAfib (she was not on OAC due to anemia with very low Fe - cleared by hematology and started on Eliquis), TIA, diabetes (diet controlled), anemia with Fe and Vit B12 injections, monoclonal gammopathy, IBS, ? allergic bronchitis, and lipoma.  She recently presented for palpitations and malaise and presents today for one month follow up after Holter.      Today, Rosalba Carter is feeling much improved. She reports since quitting her job she has had less stress/anxiety which has caused the palpitations to lessen.   Denies chest pain/pressure, no dizziness or lightheadedness, no shortness of breath. She denies lower extremity edema, orthopnea or PND.     Last Recorded Vitals:  Visit Vitals  /84 (BP Location: Left arm, Patient Position: Sitting, BP Cuff Size: Adult)   Pulse 63   Ht 1.727 m (5' 8\")   Wt 102 kg (224 lb)   SpO2 95%   BMI 34.06 kg/m²   OB Status Unknown   Smoking Status Every Day   BSA 2.21 m²        Past Medical History:  She has a past medical history of B12 deficiency (10/16/2023).    Past Surgical History:  She has a past surgical history that includes Other surgical history (11/09/2020); Other surgical history (11/09/2020); Other surgical history (11/09/2020); Other surgical history (11/09/2020); Other surgical history (11/09/2020); and Other surgical history (01/12/2021).      Social History:  She reports that she has been smoking cigarettes. She has a 40 pack-year smoking history. She has never used smokeless tobacco. She reports that she does not drink alcohol and does not use drugs.    Family History:  Family History   Problem Relation Name Age of Onset    Hypertension Mother      Diabetes Mother      Coronary artery disease " Mother      Heart attack Mother      Heart attack Father      Hypertension Father      Coronary artery disease Father      Stroke Father      Heart attack Sister      Stroke Brother          Allergies:  Carvedilol, Ibuprofen, and Latex    Outpatient Medications:  Current Outpatient Medications   Medication Instructions    albuterol 90 mcg/actuation inhaler 2 puffs, Every 4 hours PRN    apixaban (ELIQUIS) 5 mg, oral, 2 times daily    cyanocobalamin (VITAMIN B-12) 1,000 mcg, Every 30 days    fluticasone (Flonase) 50 mcg/actuation nasal spray 1 spray, Daily    lisinopril 15 mg, oral, Daily    metoprolol succinate XL (TOPROL-XL) 25 mg, oral, Daily    rosuvastatin (CRESTOR) 5 mg, oral, Daily         Review of Systems   Constitutional: Positive for malaise/fatigue (chronic, relates to anemia).   HENT: Negative.     Cardiovascular:  Positive for palpitations (improved with quitting job). Negative for chest pain, dyspnea on exertion, leg swelling, near-syncope and syncope.   Respiratory:  Positive for shortness of breath.    Endocrine: Negative.    Hematologic/Lymphatic: Negative.  Does not bruise/bleed easily.   Skin: Negative.    Musculoskeletal: Negative.    Gastrointestinal: Negative.  Negative for hematemesis, hematochezia, hemorrhoids and melena.   Genitourinary: Negative.  Negative for hematuria.   Neurological:  Positive for dizziness (on occasion, related to eating) and light-headedness.        Physical Exam  Vitals reviewed.   Constitutional:       Appearance: Normal appearance.   HENT:      Head: Normocephalic.      Mouth/Throat:      Mouth: Mucous membranes are moist.   Cardiovascular:      Rate and Rhythm: Normal rate and regular rhythm.      Pulses: Normal pulses.      Heart sounds: Normal heart sounds. No murmur heard.     No friction rub. No gallop.   Pulmonary:      Effort: Pulmonary effort is normal.      Breath sounds: Normal breath sounds. No wheezing, rhonchi or rales.   Abdominal:      General: Bowel  "sounds are normal.      Palpations: Abdomen is soft.   Musculoskeletal:         General: Normal range of motion.      Cervical back: Normal range of motion.      Right lower leg: No edema.      Left lower leg: No edema.   Skin:     General: Skin is warm and dry.   Neurological:      General: No focal deficit present.      Mental Status: She is alert and oriented to person, place, and time.   Psychiatric:         Mood and Affect: Mood normal.         Behavior: Behavior normal.         Thought Content: Thought content normal.         Judgment: Judgment normal.           Last Labs:  CBC -  Lab Results   Component Value Date    WBC 9.0 10/16/2024    HGB 14.2 10/16/2024    HCT 45.6 10/16/2024    MCV 82 10/16/2024     10/16/2024       CMP -  Lab Results   Component Value Date    CALCIUM 9.5 10/16/2024    PROT 7.4 10/16/2024    PROT 7.4 10/16/2024    ALBUMIN 4.2 10/16/2024    AST 12 10/16/2024    ALT 12 10/16/2024    ALKPHOS 108 10/16/2024    BILITOT 0.4 10/16/2024       LIPID PANEL -   Lab Results   Component Value Date    CHOL 177 04/13/2023    TRIG 86 04/13/2023    HDL 41.3 04/13/2023    CHHDL 4.3 04/13/2023    LDLF 119 (H) 04/13/2023    VLDL 17 04/13/2023       RENAL FUNCTION PANEL -   Lab Results   Component Value Date    GLUCOSE 180 (H) 10/16/2024     10/16/2024    K 4.4 10/16/2024     10/16/2024    CO2 26 10/16/2024    ANIONGAP 12 10/16/2024    BUN 16 10/16/2024    CREATININE 0.70 10/16/2024    GFRMALE CANCELED 04/15/2023    CALCIUM 9.5 10/16/2024    ALBUMIN 4.2 10/16/2024        Lab Results   Component Value Date    HGBA1C 7.5 (H) 08/05/2024       Last Cardiology Tests:  ECG:  No results found for this or any previous visit from the past 1095 days.      Echo:  TTE 4/13/23:   Left ventricular systolic function is normal with a 60-65% estimated ejection fraction.  2. Left Ventricular Global Longitudinal Strain - -19.2 %.  Ejection Fractions:  No results found for: \"EF\"    Cath:  [facility] 4/14/23: " Angiographically moderate LAD disease in right-dominant circulation.  2. Normal left-sided filling pressure and no gradient on pull-back.       Stress Test:      Cardiac Imaging:  No results found for this or any previous visit from the past 1095 days.    Holter monitor: Patient wore monitor for 13 days and 8 hours starting on 10/16/2024 and ending on 10/30/2024.  Minimum heart rate of 40 bpm, maximum heart rate of 207 beats per minute and an average heart rate of 76 bpm.  Predominant underlying rhythm was sinus rhythm.  48 SVT runs occurred, the run with the fastest interval lasting 6 beats with a max rate of 207 bpm, the longest lasting 15 seconds with an average rate of 149 bpm.  Some episodes of SVT may be possible atrial tachycardia.    Lab review: I have personally reviewed the laboratory result(s)     Assessment/Plan     Rosalba Carter is a 59 y.o. female with past medical history of moderate nonobstructive CAD, morbid obesity s/p gastric bypass surgery, pAfib (she was not on OAC due to anemia with very low Fe - cleared by hematology and started on Eliquis), TIA, diabetes (diet controlled), anemia with Fe and Vit B12 injections, monoclonal gammopathy, IBS, ? allergic bronchitis, and lipoma.  She recently presented for palpitations and malaise and presents today for one month follow up after Holter.     Palpitations  Patient recently presented to office with worsening palpitations  Holter monitor: 48 SVT runs, longest lasting 15 seconds.   Today, patient reports palpitations are improved since quitting her job.   Continue on  metoprolol XL  25 mg daily.    Coronary artery disease   [facility] 4/14/23: Angiographically moderate LAD disease in right-dominant circulation.  TTE 4/23: Normal LVEF  Today, denies chest pain or pressure   (8/2024), not at goal, will increase Rosuvastatin to 20 mg daily  Blood pressure is well controlled.   Continue on Metoprolol XL 25 mg daily, increase Rosuvastatin as above,  lisinopril 15 mg daily. Not on ASA on Apixaban    pAFib  TTE 4/23: Normal LVEF  No atrial fibrillation found on Holter monitor  Today, palpitations well controlled as above  Continue on Apixaban for thromboembolism prophylaxis, no abnormal bleeding  Continue on metoprolol XL 25 mg daily for rate control    Hypertension  Today's /84  Well controlled  At office visit lisinopril was increased to 15 mg daily  Continue on lisinopril 15 mg daily.     Lashawn Miranda, APRN-CNP

## 2024-11-22 ENCOUNTER — OFFICE VISIT (OUTPATIENT)
Dept: CARDIOLOGY | Facility: HOSPITAL | Age: 59
End: 2024-11-22
Payer: COMMERCIAL

## 2024-11-22 VITALS
HEART RATE: 63 BPM | DIASTOLIC BLOOD PRESSURE: 84 MMHG | OXYGEN SATURATION: 95 % | BODY MASS INDEX: 33.95 KG/M2 | SYSTOLIC BLOOD PRESSURE: 126 MMHG | HEIGHT: 68 IN | WEIGHT: 224 LBS

## 2024-11-22 DIAGNOSIS — G45.9 TIA (TRANSIENT ISCHEMIC ATTACK): ICD-10-CM

## 2024-11-22 DIAGNOSIS — I10 HYPERTENSION, UNSPECIFIED TYPE: ICD-10-CM

## 2024-11-22 DIAGNOSIS — I48.91 ATRIAL FIBRILLATION, UNSPECIFIED TYPE (MULTI): Chronic | ICD-10-CM

## 2024-11-22 DIAGNOSIS — E78.5 HYPERLIPIDEMIA, UNSPECIFIED HYPERLIPIDEMIA TYPE: ICD-10-CM

## 2024-11-22 DIAGNOSIS — R93.1 ABNORMAL SCREENING CARDIAC CT: Primary | ICD-10-CM

## 2024-11-22 DIAGNOSIS — I48.0 PAROXYSMAL ATRIAL FIBRILLATION (MULTI): Chronic | ICD-10-CM

## 2024-11-22 PROCEDURE — 4010F ACE/ARB THERAPY RXD/TAKEN: CPT | Performed by: CLINICAL NURSE SPECIALIST

## 2024-11-22 PROCEDURE — 4004F PT TOBACCO SCREEN RCVD TLK: CPT | Performed by: CLINICAL NURSE SPECIALIST

## 2024-11-22 PROCEDURE — 99214 OFFICE O/P EST MOD 30 MIN: CPT | Performed by: CLINICAL NURSE SPECIALIST

## 2024-11-22 PROCEDURE — 3008F BODY MASS INDEX DOCD: CPT | Performed by: CLINICAL NURSE SPECIALIST

## 2024-11-22 PROCEDURE — 3074F SYST BP LT 130 MM HG: CPT | Performed by: CLINICAL NURSE SPECIALIST

## 2024-11-22 PROCEDURE — 3079F DIAST BP 80-89 MM HG: CPT | Performed by: CLINICAL NURSE SPECIALIST

## 2024-11-22 RX ORDER — ROSUVASTATIN CALCIUM 20 MG/1
20 TABLET, COATED ORAL DAILY
Qty: 90 TABLET | Refills: 3 | Status: SHIPPED | OUTPATIENT
Start: 2024-11-22 | End: 2025-11-22

## 2024-11-22 ASSESSMENT — ENCOUNTER SYMPTOMS
BRUISES/BLEEDS EASILY: 0
SHORTNESS OF BREATH: 1
LIGHT-HEADEDNESS: 1
HEMATOLOGIC/LYMPHATIC NEGATIVE: 1
DYSPNEA ON EXERTION: 0
HEMATEMESIS: 0
MUSCULOSKELETAL NEGATIVE: 1
HEMATURIA: 0
ENDOCRINE NEGATIVE: 1
SYNCOPE: 0
GASTROINTESTINAL NEGATIVE: 1
DIZZINESS: 1
PALPITATIONS: 1
NEAR-SYNCOPE: 0
HEMATOCHEZIA: 0

## 2024-11-22 NOTE — PATIENT INSTRUCTIONS
I recommend we increase your Rosuvastatin to 20 mg daily to help lower your cholesterol.   Labs have been ordered for you to do in 3 months, please do fasting.   Call our office with any new cardiac concerns  Continue current medications  Continue heart-healthy diet. A diet low in sodium, low in cholesterol, limiting red meats and eating whole foods.   Follow up with Dr. Irvin in 6 months

## 2024-11-27 NOTE — TELEPHONE ENCOUNTER
Called her with results and plan.  She is agreeable to the referral.  Order placed and message to Jossue to assist with scheduling.

## 2024-12-30 ENCOUNTER — TELEPHONE (OUTPATIENT)
Dept: CARDIOLOGY | Facility: HOSPITAL | Age: 59
End: 2024-12-30
Payer: COMMERCIAL

## 2024-12-30 NOTE — TELEPHONE ENCOUNTER
Called patient and left voicemail that we have new EP coverage to call our office back to get it rescheduled.    Patient/Caregiver

## 2025-01-02 ENCOUNTER — APPOINTMENT (OUTPATIENT)
Dept: HEMATOLOGY/ONCOLOGY | Facility: CLINIC | Age: 60
End: 2025-01-02
Payer: COMMERCIAL

## 2025-01-28 ENCOUNTER — APPOINTMENT (OUTPATIENT)
Dept: CARDIOLOGY | Facility: HOSPITAL | Age: 60
End: 2025-01-28
Payer: COMMERCIAL

## 2025-02-04 ENCOUNTER — APPOINTMENT (OUTPATIENT)
Dept: CARDIOLOGY | Facility: HOSPITAL | Age: 60
End: 2025-02-04
Payer: COMMERCIAL

## 2025-04-01 ENCOUNTER — APPOINTMENT (OUTPATIENT)
Dept: HEMATOLOGY/ONCOLOGY | Facility: CLINIC | Age: 60
End: 2025-04-01

## 2025-04-16 ENCOUNTER — APPOINTMENT (OUTPATIENT)
Dept: HEMATOLOGY/ONCOLOGY | Facility: CLINIC | Age: 60
End: 2025-04-16

## 2025-07-24 ENCOUNTER — OFFICE VISIT (OUTPATIENT)
Dept: HEMATOLOGY/ONCOLOGY | Facility: CLINIC | Age: 60
End: 2025-07-24
Payer: COMMERCIAL

## 2025-07-24 VITALS
RESPIRATION RATE: 16 BRPM | TEMPERATURE: 97.3 F | HEIGHT: 66 IN | HEART RATE: 83 BPM | WEIGHT: 221.56 LBS | BODY MASS INDEX: 35.61 KG/M2 | OXYGEN SATURATION: 97 % | DIASTOLIC BLOOD PRESSURE: 65 MMHG | SYSTOLIC BLOOD PRESSURE: 127 MMHG

## 2025-07-24 DIAGNOSIS — D64.89 ANEMIA DUE TO OTHER CAUSE, NOT CLASSIFIED: ICD-10-CM

## 2025-07-24 DIAGNOSIS — D47.2 MONOCLONAL GAMMOPATHY: Primary | ICD-10-CM

## 2025-07-24 DIAGNOSIS — E53.8 B12 DEFICIENCY: ICD-10-CM

## 2025-07-24 DIAGNOSIS — K90.49 MALABSORPTION DUE TO INTOLERANCE, NOT ELSEWHERE CLASSIFIED: ICD-10-CM

## 2025-07-24 PROBLEM — K90.9 MALABSORPTION (HHS-HCC): Status: ACTIVE | Noted: 2025-07-24

## 2025-07-24 LAB
ALBUMIN SERPL BCP-MCNC: 4.1 G/DL (ref 3.4–5)
ALP SERPL-CCNC: 104 U/L (ref 33–136)
ALT SERPL W P-5'-P-CCNC: 10 U/L (ref 7–45)
ANION GAP SERPL CALC-SCNC: 8 MMOL/L (ref 10–20)
AST SERPL W P-5'-P-CCNC: 11 U/L (ref 9–39)
BASOPHILS # BLD AUTO: 0.05 X10*3/UL (ref 0–0.1)
BASOPHILS NFR BLD AUTO: 0.4 %
BILIRUB SERPL-MCNC: 0.3 MG/DL (ref 0–1.2)
BUN SERPL-MCNC: 16 MG/DL (ref 6–23)
CALCIUM SERPL-MCNC: 9.6 MG/DL (ref 8.6–10.3)
CHLORIDE SERPL-SCNC: 104 MMOL/L (ref 98–107)
CO2 SERPL-SCNC: 27 MMOL/L (ref 21–32)
CREAT SERPL-MCNC: 0.75 MG/DL (ref 0.5–1.05)
EGFRCR SERPLBLD CKD-EPI 2021: >90 ML/MIN/1.73M*2
EOSINOPHIL # BLD AUTO: 0.24 X10*3/UL (ref 0–0.7)
EOSINOPHIL NFR BLD AUTO: 2.1 %
ERYTHROCYTE [DISTWIDTH] IN BLOOD BY AUTOMATED COUNT: 13.9 % (ref 11.5–14.5)
GLUCOSE SERPL-MCNC: 149 MG/DL (ref 74–99)
HCT VFR BLD AUTO: 40.2 % (ref 36–46)
HGB BLD-MCNC: 12.4 G/DL (ref 12–16)
IMM GRANULOCYTES # BLD AUTO: 0.02 X10*3/UL (ref 0–0.7)
IMM GRANULOCYTES NFR BLD AUTO: 0.2 % (ref 0–0.9)
IRON SATN MFR SERPL: 13 % (ref 25–45)
IRON SERPL-MCNC: 50 UG/DL (ref 35–150)
LYMPHOCYTES # BLD AUTO: 2.75 X10*3/UL (ref 1.2–4.8)
LYMPHOCYTES NFR BLD AUTO: 24.2 %
MCH RBC QN AUTO: 24.8 PG (ref 26–34)
MCHC RBC AUTO-ENTMCNC: 30.8 G/DL (ref 32–36)
MCV RBC AUTO: 80 FL (ref 80–100)
MONOCYTES # BLD AUTO: 0.84 X10*3/UL (ref 0.1–1)
MONOCYTES NFR BLD AUTO: 7.4 %
NEUTROPHILS # BLD AUTO: 7.48 X10*3/UL (ref 1.2–7.7)
NEUTROPHILS NFR BLD AUTO: 65.7 %
NRBC BLD-RTO: ABNORMAL /100{WBCS}
PLATELET # BLD AUTO: 287 X10*3/UL (ref 150–450)
POTASSIUM SERPL-SCNC: 4.1 MMOL/L (ref 3.5–5.3)
PROT SERPL-MCNC: 7.1 G/DL (ref 6.4–8.2)
RBC # BLD AUTO: 5.01 X10*6/UL (ref 4–5.2)
SODIUM SERPL-SCNC: 135 MMOL/L (ref 136–145)
TIBC SERPL-MCNC: 399 UG/DL (ref 240–445)
TSH SERPL-ACNC: 1.22 MIU/L (ref 0.44–3.98)
UIBC SERPL-MCNC: 349 UG/DL (ref 110–370)
VIT B12 SERPL-MCNC: 302 PG/ML (ref 211–911)
WBC # BLD AUTO: 11.4 X10*3/UL (ref 4.4–11.3)

## 2025-07-24 PROCEDURE — 3078F DIAST BP <80 MM HG: CPT | Performed by: NURSE PRACTITIONER

## 2025-07-24 PROCEDURE — 3074F SYST BP LT 130 MM HG: CPT | Performed by: NURSE PRACTITIONER

## 2025-07-24 PROCEDURE — 36415 COLL VENOUS BLD VENIPUNCTURE: CPT | Performed by: NURSE PRACTITIONER

## 2025-07-24 PROCEDURE — 82607 VITAMIN B-12: CPT | Mod: PORLAB | Performed by: NURSE PRACTITIONER

## 2025-07-24 PROCEDURE — 99214 OFFICE O/P EST MOD 30 MIN: CPT | Performed by: NURSE PRACTITIONER

## 2025-07-24 PROCEDURE — 80053 COMPREHEN METABOLIC PANEL: CPT | Performed by: NURSE PRACTITIONER

## 2025-07-24 PROCEDURE — 3008F BODY MASS INDEX DOCD: CPT | Performed by: NURSE PRACTITIONER

## 2025-07-24 PROCEDURE — 83540 ASSAY OF IRON: CPT | Performed by: NURSE PRACTITIONER

## 2025-07-24 PROCEDURE — 4010F ACE/ARB THERAPY RXD/TAKEN: CPT | Performed by: NURSE PRACTITIONER

## 2025-07-24 PROCEDURE — 84443 ASSAY THYROID STIM HORMONE: CPT | Performed by: NURSE PRACTITIONER

## 2025-07-24 PROCEDURE — 85025 COMPLETE CBC W/AUTO DIFF WBC: CPT | Performed by: NURSE PRACTITIONER

## 2025-07-24 RX ORDER — CYANOCOBALAMIN 1000 UG/ML
1000 INJECTION, SOLUTION INTRAMUSCULAR; SUBCUTANEOUS
Qty: 3 ML | Refills: 3 | Status: SHIPPED | OUTPATIENT
Start: 2025-07-24

## 2025-07-24 RX ORDER — FAMOTIDINE 10 MG/ML
20 INJECTION, SOLUTION INTRAVENOUS ONCE AS NEEDED
Status: CANCELLED | OUTPATIENT
Start: 2025-07-31

## 2025-07-24 RX ORDER — EPINEPHRINE 0.3 MG/.3ML
0.3 INJECTION SUBCUTANEOUS EVERY 5 MIN PRN
Status: CANCELLED | OUTPATIENT
Start: 2025-07-31

## 2025-07-24 RX ORDER — ALBUTEROL SULFATE 0.83 MG/ML
3 SOLUTION RESPIRATORY (INHALATION) AS NEEDED
Status: CANCELLED | OUTPATIENT
Start: 2025-07-31

## 2025-07-24 RX ORDER — DIPHENHYDRAMINE HYDROCHLORIDE 50 MG/ML
50 INJECTION, SOLUTION INTRAMUSCULAR; INTRAVENOUS AS NEEDED
Status: CANCELLED | OUTPATIENT
Start: 2025-07-31

## 2025-07-24 ASSESSMENT — PAIN SCALES - GENERAL: PAINLEVEL_OUTOF10: 0-NO PAIN

## 2025-07-24 NOTE — PROGRESS NOTES
Patient Visit Information:   Visit Type: Follow Up Visit      History of Present Illness:      ID Statement:    REGGIE BARRIGA is a 60 year old Female                                                                          Reggie Barriga     1965  MRN: [id number]        Chief Complaint: IgM lambda monoclonal gammopathy,  B12 deficiency   Interval History:    Diagnosis 1.  B12 deficiency #2 iron deficiency #3 gastric bypass #4 IgM monoclonal gammopathy      Patient is a 60-year-old female with history of  diabetes mellitus, hypertension, iron deficiency anemia, B12 deficiency anemia, s/p  gastric   bypass,      Patient has long history of weakness, fatigue, muscular pain and has history of chronic anemia.  Patient is unable to absorb iron and b12 by mouth.  She quit taking B12 injections about one year ago in the infusion center.  She feels extra fatigued.  She states she cannot even walk across the vuong without severe fatigue.  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   She has generalized weakness and fatigue.  She has history of anxiety and depression.     CMP and LFT within normal limits, GUIDO negative, CRP 0.81 and sedimentation rate 47.     Serum protein electrophoresis and immunofixation did show IgM lambda monoclonal gammopathy, very small quantity of M spike, difficult to quantitate.     Patient is here for further evaluation.     Lab results discussed with the patient, medical records reviewed with patient.     Patient not compliant to medical care, no screening colonoscopy is done.   September 21, 2020     Patient has a history of iron deficiency anemia and B12 deficiency anemia and feeling better after IV Feraheme and B12 supplement.  Hemoglobin is stable,    Patient has IgM lambda monoclonal gammopathy with a due to MGUS which is stable      December 7, 2020     Patient is still complaining of weakness and fatigue, good appetite not doing regular  exercise, he not working at this time.  Hemoglobin is 14.9.     March 1, 2021      Patient is feeling better today hemoglobin is 15.1, patient is receiving B12 injection every 4 weeks.  Good appetite, no nausea vomiting, no abdominal pain, still complaining of weakness and fatigue which is improving  June 21, 2021      Patient has a history of iron deficiency anemia and B12 deficiency anemia, patient receiving B12 injection every 4 weekly hemoglobin have been stable.  Patient is feeling better, something patient denied fatigue, good appetite, no nausea vomiting, no  abdominal pain, no diarrhea, no history of infection or bony pain.    10/16/24  Patient history of B12 deficiency anemia which have been resolved patient is receiving B12 injection.   Patient still has some weakness and fatigue.  Hemoglobin have been stable.   No fever, good appetite no nausea vomiting, no abdominal pain, patient was complaining of weakness and fatigue still working full-time.   History of IgM monoclonal gammopathy which also have been stable  Today hemoglobin 14.2    7/24/25  Pt stopped taking B12 injections one year ago.  She is feeling more fatigued.  Cannot even walk more than 15 feet without severe fatigue.  Severe ice chewing has been worse over past few months.  Unable to tolerate oral iron or b12 due to gastric bypass.  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      Review of Systems:   Review of Systems:    Patient is very anxious,     Complaining of generalized weakness, fatigue, muscular pain     No headache and dizziness     No sore throat today, no fever     No chest pain, no shortness of breath, no cough     No nausea vomiting     No dyspepsia     Nonspecific abdominal pain     No diarrhea and constipation, no reactive bleed     No dysuria and hematuria     No vaginal discharge, status post hysterectomy     Patient not very active most times to  home     She not doing regular exercise     No history of fall     Generalized weakness        Allergies and Intolerances:       Allergies:         ibuprofen: Drug, Unknown, Active         Latex: Latex, Hives/Urticaria, Active     Outpatient Medication Profile:  * Patient Currently Takes Medications as of 07-Dec-2022 12:58 documented in Structured Notes         Vitamin D3 : Last Dose Taken:  , orally once a day         Vitamin C: Last Dose Taken:  , orally once a day         multivitamin Multiple Vitamins oral capsule: Last Dose Taken:  , 1 cap(s)  oral once a day         Crestor 5 mg oral tablet: Last Dose Taken:  , 1 tab(s) orally once a  day         lisinopril 20 mg oral tablet: Last Dose Taken:  , 1 tab(s) orally once  a day         metoprolol: Last Dose Taken:               Medical History:         Smoking: ICD-10: F17.200, Status: Active         B12 deficiency anemia: ICD-10: D51.9, Status: Active         Iron deficiency anemia: ICD-10: D50.9, Status: Active         IgM monoclonal gammopathy of uncertain significance: ICD-10:  D47.2, Status: Active         Hypertension: ICD-10: I10, Status: Active         Diabetes mellitus: ICD-10: E11.9, Status: Active       Surg History:         Status post hysterectomy: ICD-10: Z90.710, Status: Active         Status post gastric bypass for obesity: ICD-10: Z98.84, Status:  Active     Family History: No Family History items are recorded  in the problem list.      Social History:   Social Substance History:  ·  Smoking Status current every day smoker            Vitals and Measurements:   Vitals: Temp: 36.7  HR: 77  RR: 16  BP: 140/84  SPO2%:   95   Measurements: HT(cm): 170.5  WT(kg): 98.9  BSA: 2.16   BMI:  34   Last 3 Weights & Heights: Date:                           Weight/Scale Type:                    Height:   07-Dec-2022 12:49                98.9  kg                     170.5  cm  16-Nov-2022 12:49                99.1  kg                     170.5  cm  21-Sep-2022  "13:20                97.5  kg                     170.5  cm      Physical Exam:      Constitutional: awake/alert/oriented x3, no distress,   Eyes: PERRL, EOMI, clear sclera   ENMT: mucous membranes moist,   Head/Neck: Neck supple,   Respiratory/Thorax: Patent airways, CTAB, normal  breath sounds   Cardiovascular: Regular, rate and rhythm,  , normal  S 1and S 2   Gastrointestinal: Nondistended, soft,   nontender,      no organomegaly, +BS,   Extremities: normal extremities, no cyanosis edema,   Neurological: alert and oriented x3,   Lymphatic: No significant lymphadenopathy   Psychological: Appropriate mood and behavior, very  anxious   Skin: Warm and dry, no lesions, no rashes         Lab Results:   Labs:  Lab Results   Component Value Date    WBC 11.4 (H) 07/24/2025    NEUTROABS 7.48 07/24/2025    IGABSOL 0.02 07/24/2025    LYMPHSABS 2.75 07/24/2025    MONOSABS 0.84 07/24/2025    EOSABS 0.24 07/24/2025    BASOSABS 0.05 07/24/2025    RBC 5.01 07/24/2025    MCV 80 07/24/2025    MCHC 30.8 (L) 07/24/2025    HGB 12.4 07/24/2025    HCT 40.2 07/24/2025     07/24/2025     No results found for: \"RETICCTPCT\"   Lab Results   Component Value Date    CREATININE 0.70 10/16/2024    BUN 16 10/16/2024    EGFR >90 10/16/2024     10/16/2024    K 4.4 10/16/2024     10/16/2024    CO2 26 10/16/2024      Lab Results   Component Value Date    ALT 12 10/16/2024    AST 12 10/16/2024    ALKPHOS 108 10/16/2024    BILITOT 0.4 10/16/2024      Lab Results   Component Value Date    TSH 1.55 07/29/2020     Lab Results   Component Value Date    TSH 1.55 07/29/2020     Lab Results   Component Value Date    IRON 63 10/16/2024    TIBC 420 10/16/2024    FERRITIN 21 10/16/2023      Lab Results   Component Value Date    [id number] 181 (L) 10/16/2024      Lab Results   Component Value Date    FOLATE >23.3 07/29/2020     Lab Results   Component Value Date    GUIDO NEGATIVE 06/15/2020    SEDRATE 47 (H) 06/15/2020      Lab Results " "  Component Value Date    CRP 0.81 06/15/2020      No results found for: \"MELINDA\"  Lab Results   Component Value Date     06/15/2020       Lab Results   Component Value Date    SPEP Normal. 10/16/2024     Lab Results   Component Value Date     10/16/2023    IGM 80 10/16/2024     10/16/2023     Assessment and Plan:   Assessment:    #1  MGUS, IgM lambda monoclonal gammopathy     #2.  S/p gastric bypass , obesity  , BMI of 38.2,     #3 iron deficiency anemia  Schedule iv feraheme 510 mg iv x 2   Iron studies pending      #4 B12 deficiency anemia  Restart b12 injections but do subcutaneous injections monthly at home     #5 diabetes mellitus     #6 hypertension     #7 anxiety        Patient is a 60-year-old female with a history of hypertension, diabetes mellitus, status post gastric bypass, history of iron deficiency anemia and B12 deficiency anemia was evaluated for possible bronchitis.     Serum protein electrophoresis and immunofixation did show IgM lambda monoclonal gammopathy.  Most probably we are dealing with the MGUS.  Renal function within normal limits, calcium level within normal limits, patient with chronic anemia due to iron  deficiency anemia, clinically I am not suspecting multiple myeloma.     Pathophysiology paraproteinemia discussed with the patient and     #2 iron deficiency anemia, it is due to malabsorption, I will make a arrangement for IV Feraheme and check iron studies today     #3 B12 deficiency anemia, will make arrangements for B12 injections subcutanous at home      #4 BMI 38.2, patient advised to have regular exercise,      #4 smoking,   long discussion with the patient regarding possible complications of long-term smoking and suggested to stop smoking and   patient promised to do so.     Plan I will check IgG M level today and also check beta-2 microglobulin and serum light chain.  I will check iron studies and B12 level and make arrangement for IV Feraheme and B12 " supplement     March 1, 2021     #1 iron deficiency anemia, hemoglobin is stable     #2 B12 deficiency anemia, continue B12 supplements every 4 weeks     #3 MGUS, IgM lambda monoclonal gammopathy, stable continue to monitor     #4 BMI 38, continued regular exercise, Attempted Now More Than 2000-calorie per Day.       Lab Discussed with the Patient, Continue B12 Supplement, repeat  CBC, Iron Studies, B12 after 3 Months     June 21, 2021        #1 iron deficiency anemia, hemoglobin is stable     #2 B12 deficiency anemia, continue B12 supplements every 4 weeks     #3 MGUS, IgM lambda monoclonal gammopathy, stable continue to monitor     #4 BMI 37, continued regular exercise, Attempted Now More Than 2000-calorie per Day.     Clinically patient is doing very well, hemoglobin is stable, patient will receive B12 injection today then changed B12 injection every 8 weeks.  I will check iron studies and B12 level today.         Reevaluation after 4 months.      Monitor CBC, iron studies and B12 level.      Again suggested to have regular exercise try to lose some body weight.     10/16/24        #1 iron deficiency anemia, hemoglobin is stable     #2 B12 deficiency anemia, continue B12 supplements every 4 weeks change to subcutaneous at home      #3 MGUS, IgM lambda monoclonal gammopathy, stable continue to monitor     #4 BMI 34, continued regular exercise, Attempted No More Than 2000-calorie per Day.     Clinically stable, hemoglobin is stable, B12 injection every 12 weeks.      Repeat CBC after 3 months  ,check iron study and B12 level.  Follow-up 6 months  Plan:    Plan as above.      Follow-up after 6 months     Time spent 20 minutes    Sol Pereira, APRN-CNP

## 2025-07-25 RX ORDER — FAMOTIDINE 10 MG/ML
20 INJECTION, SOLUTION INTRAVENOUS ONCE AS NEEDED
OUTPATIENT
Start: 2025-08-14

## 2025-07-25 RX ORDER — ALBUTEROL SULFATE 0.83 MG/ML
3 SOLUTION RESPIRATORY (INHALATION) AS NEEDED
OUTPATIENT
Start: 2025-08-14

## 2025-07-25 RX ORDER — DIPHENHYDRAMINE HYDROCHLORIDE 50 MG/ML
50 INJECTION, SOLUTION INTRAMUSCULAR; INTRAVENOUS AS NEEDED
OUTPATIENT
Start: 2025-08-14

## 2025-07-25 RX ORDER — EPINEPHRINE 0.3 MG/.3ML
0.3 INJECTION SUBCUTANEOUS EVERY 5 MIN PRN
OUTPATIENT
Start: 2025-08-14

## 2025-08-14 ENCOUNTER — APPOINTMENT (OUTPATIENT)
Dept: HEMATOLOGY/ONCOLOGY | Facility: CLINIC | Age: 60
End: 2025-08-14
Payer: COMMERCIAL

## 2025-08-19 ENCOUNTER — APPOINTMENT (OUTPATIENT)
Dept: HEMATOLOGY/ONCOLOGY | Facility: CLINIC | Age: 60
End: 2025-08-19
Payer: COMMERCIAL

## 2025-08-19 ENCOUNTER — INFUSION (OUTPATIENT)
Dept: HEMATOLOGY/ONCOLOGY | Facility: CLINIC | Age: 60
End: 2025-08-19
Payer: COMMERCIAL

## 2025-08-19 VITALS
DIASTOLIC BLOOD PRESSURE: 70 MMHG | TEMPERATURE: 97.7 F | RESPIRATION RATE: 16 BRPM | OXYGEN SATURATION: 97 % | SYSTOLIC BLOOD PRESSURE: 141 MMHG | HEART RATE: 63 BPM

## 2025-08-19 DIAGNOSIS — E53.8 B12 DEFICIENCY: ICD-10-CM

## 2025-08-19 DIAGNOSIS — K90.49 MALABSORPTION DUE TO INTOLERANCE, NOT ELSEWHERE CLASSIFIED: ICD-10-CM

## 2025-08-19 DIAGNOSIS — D64.89 ANEMIA DUE TO OTHER CAUSE, NOT CLASSIFIED: ICD-10-CM

## 2025-08-19 PROCEDURE — 2500000004 HC RX 250 GENERAL PHARMACY W/ HCPCS (ALT 636 FOR OP/ED): Performed by: NURSE PRACTITIONER

## 2025-08-19 PROCEDURE — 96374 THER/PROPH/DIAG INJ IV PUSH: CPT | Mod: INF

## 2025-08-19 PROCEDURE — 96365 THER/PROPH/DIAG IV INF INIT: CPT | Mod: INF

## 2025-08-19 RX ORDER — DIPHENHYDRAMINE HYDROCHLORIDE 50 MG/ML
50 INJECTION, SOLUTION INTRAMUSCULAR; INTRAVENOUS AS NEEDED
Status: DISCONTINUED | OUTPATIENT
Start: 2025-08-19 | End: 2025-08-19 | Stop reason: HOSPADM

## 2025-08-19 RX ORDER — EPINEPHRINE 0.3 MG/.3ML
0.3 INJECTION SUBCUTANEOUS EVERY 5 MIN PRN
OUTPATIENT
Start: 2025-08-26

## 2025-08-19 RX ORDER — DIPHENHYDRAMINE HYDROCHLORIDE 50 MG/ML
50 INJECTION, SOLUTION INTRAMUSCULAR; INTRAVENOUS AS NEEDED
OUTPATIENT
Start: 2025-08-26

## 2025-08-19 RX ORDER — ALBUTEROL SULFATE 0.83 MG/ML
3 SOLUTION RESPIRATORY (INHALATION) AS NEEDED
Status: DISCONTINUED | OUTPATIENT
Start: 2025-08-19 | End: 2025-08-19 | Stop reason: HOSPADM

## 2025-08-19 RX ORDER — FAMOTIDINE 10 MG/ML
20 INJECTION, SOLUTION INTRAVENOUS ONCE AS NEEDED
Status: DISCONTINUED | OUTPATIENT
Start: 2025-08-19 | End: 2025-08-19 | Stop reason: HOSPADM

## 2025-08-19 RX ORDER — FAMOTIDINE 10 MG/ML
20 INJECTION, SOLUTION INTRAVENOUS ONCE AS NEEDED
OUTPATIENT
Start: 2025-08-26

## 2025-08-19 RX ORDER — EPINEPHRINE 0.3 MG/.3ML
0.3 INJECTION SUBCUTANEOUS EVERY 5 MIN PRN
Status: DISCONTINUED | OUTPATIENT
Start: 2025-08-19 | End: 2025-08-19 | Stop reason: HOSPADM

## 2025-08-19 RX ORDER — ALBUTEROL SULFATE 0.83 MG/ML
3 SOLUTION RESPIRATORY (INHALATION) AS NEEDED
OUTPATIENT
Start: 2025-08-26

## 2025-08-19 RX ADMIN — IRON SUCROSE 300 MG: 20 INJECTION, SOLUTION INTRAVENOUS at 14:17

## 2025-08-19 ASSESSMENT — PAIN SCALES - GENERAL: PAINLEVEL_OUTOF10: 0-NO PAIN

## 2025-08-26 ENCOUNTER — INFUSION (OUTPATIENT)
Dept: HEMATOLOGY/ONCOLOGY | Facility: CLINIC | Age: 60
End: 2025-08-26
Payer: COMMERCIAL

## 2025-08-26 VITALS
WEIGHT: 222 LBS | DIASTOLIC BLOOD PRESSURE: 74 MMHG | TEMPERATURE: 97.7 F | OXYGEN SATURATION: 95 % | SYSTOLIC BLOOD PRESSURE: 155 MMHG | HEART RATE: 60 BPM | RESPIRATION RATE: 16 BRPM | HEIGHT: 66 IN | BODY MASS INDEX: 35.68 KG/M2

## 2025-08-26 DIAGNOSIS — E53.8 B12 DEFICIENCY: ICD-10-CM

## 2025-08-26 DIAGNOSIS — D64.89 ANEMIA DUE TO OTHER CAUSE, NOT CLASSIFIED: ICD-10-CM

## 2025-08-26 DIAGNOSIS — K90.49 MALABSORPTION DUE TO INTOLERANCE, NOT ELSEWHERE CLASSIFIED: ICD-10-CM

## 2025-08-26 PROCEDURE — 96365 THER/PROPH/DIAG IV INF INIT: CPT | Mod: INF

## 2025-08-26 PROCEDURE — 2500000004 HC RX 250 GENERAL PHARMACY W/ HCPCS (ALT 636 FOR OP/ED): Performed by: NURSE PRACTITIONER

## 2025-08-26 RX ORDER — ALBUTEROL SULFATE 0.83 MG/ML
3 SOLUTION RESPIRATORY (INHALATION) AS NEEDED
OUTPATIENT
Start: 2025-09-02

## 2025-08-26 RX ORDER — HEPARIN SODIUM,PORCINE/PF 10 UNIT/ML
50 SYRINGE (ML) INTRAVENOUS AS NEEDED
OUTPATIENT
Start: 2025-08-26

## 2025-08-26 RX ORDER — DIPHENHYDRAMINE HYDROCHLORIDE 50 MG/ML
50 INJECTION, SOLUTION INTRAMUSCULAR; INTRAVENOUS AS NEEDED
OUTPATIENT
Start: 2025-09-02

## 2025-08-26 RX ORDER — FAMOTIDINE 10 MG/ML
20 INJECTION, SOLUTION INTRAVENOUS ONCE AS NEEDED
OUTPATIENT
Start: 2025-09-02

## 2025-08-26 RX ORDER — EPINEPHRINE 0.3 MG/.3ML
0.3 INJECTION SUBCUTANEOUS EVERY 5 MIN PRN
Status: DISCONTINUED | OUTPATIENT
Start: 2025-08-26 | End: 2025-08-26 | Stop reason: HOSPADM

## 2025-08-26 RX ORDER — EPINEPHRINE 0.3 MG/.3ML
0.3 INJECTION SUBCUTANEOUS EVERY 5 MIN PRN
OUTPATIENT
Start: 2025-09-02

## 2025-08-26 RX ORDER — HEPARIN 100 UNIT/ML
500 SYRINGE INTRAVENOUS AS NEEDED
OUTPATIENT
Start: 2025-08-26

## 2025-08-26 RX ORDER — ALBUTEROL SULFATE 0.83 MG/ML
3 SOLUTION RESPIRATORY (INHALATION) AS NEEDED
Status: DISCONTINUED | OUTPATIENT
Start: 2025-08-26 | End: 2025-08-26 | Stop reason: HOSPADM

## 2025-08-26 RX ORDER — FAMOTIDINE 10 MG/ML
20 INJECTION, SOLUTION INTRAVENOUS ONCE AS NEEDED
Status: DISCONTINUED | OUTPATIENT
Start: 2025-08-26 | End: 2025-08-26 | Stop reason: HOSPADM

## 2025-08-26 RX ORDER — DIPHENHYDRAMINE HYDROCHLORIDE 50 MG/ML
50 INJECTION, SOLUTION INTRAMUSCULAR; INTRAVENOUS AS NEEDED
Status: DISCONTINUED | OUTPATIENT
Start: 2025-08-26 | End: 2025-08-26 | Stop reason: HOSPADM

## 2025-08-26 RX ADMIN — IRON SUCROSE 300 MG: 20 INJECTION, SOLUTION INTRAVENOUS at 10:39

## 2025-08-26 ASSESSMENT — PAIN SCALES - GENERAL: PAINLEVEL_OUTOF10: 0-NO PAIN

## 2025-09-02 ENCOUNTER — APPOINTMENT (OUTPATIENT)
Dept: HEMATOLOGY/ONCOLOGY | Facility: CLINIC | Age: 60
End: 2025-09-02
Payer: COMMERCIAL

## 2025-09-03 ENCOUNTER — APPOINTMENT (OUTPATIENT)
Dept: HEMATOLOGY/ONCOLOGY | Facility: CLINIC | Age: 60
End: 2025-09-03
Payer: COMMERCIAL